# Patient Record
Sex: FEMALE | Race: WHITE | NOT HISPANIC OR LATINO | Employment: FULL TIME | URBAN - METROPOLITAN AREA
[De-identification: names, ages, dates, MRNs, and addresses within clinical notes are randomized per-mention and may not be internally consistent; named-entity substitution may affect disease eponyms.]

---

## 2017-01-23 PROBLEM — Z01.419 WELL WOMAN EXAM WITH ROUTINE GYNECOLOGICAL EXAM: Chronic | Status: ACTIVE | Noted: 2017-01-23

## 2018-11-30 PROBLEM — Z01.419 WELL WOMAN EXAM WITH ROUTINE GYNECOLOGICAL EXAM: Status: ACTIVE | Noted: 2017-01-23

## 2018-12-05 ENCOUNTER — OFFICE VISIT (OUTPATIENT)
Dept: OBSTETRICS AND GYNECOLOGY | Facility: CLINIC | Age: 33
End: 2018-12-05

## 2018-12-05 VITALS — RESPIRATION RATE: 14 BRPM | DIASTOLIC BLOOD PRESSURE: 72 MMHG | WEIGHT: 192 LBS | SYSTOLIC BLOOD PRESSURE: 110 MMHG

## 2018-12-05 DIAGNOSIS — Z01.419 WELL WOMAN EXAM WITH ROUTINE GYNECOLOGICAL EXAM: Primary | ICD-10-CM

## 2018-12-05 PROCEDURE — 99385 PREV VISIT NEW AGE 18-39: CPT | Performed by: OBSTETRICS & GYNECOLOGY

## 2018-12-05 NOTE — PROGRESS NOTES
Subjective   Chief Complaint   Patient presents with   • [facility]     Marni Danielle is a 33 y.o. year old  presenting to be seen for her annual exam.     SEXUAL Hx:  She is currently sexually active.  In the past year there there has been NO new sexual partners.    Condoms are never used.  She would not like to be screened for STD's at today's exam.  Current birth control method: Essure.  She is happy with her current method of contraception and does not want to discuss alternative methods of contraception.  MENSTRUAL Hx:  Patient's last menstrual period was 11/15/2018 (approximate).  In the past 6 months her cycles have been regular, predictable and occur monthly.  Her menstrual flow is typically normal.   Each month on average there are roughly 0 day(s) of very heavy flow.    Intermenstrual bleeding is absent.    Post-coital bleeding is present - intermittently.  Dysmenorrhea: is not affecting her activities of daily living  PMS: is not affecting her activities of daily living  Her cycles are not a source of concern for her that she wishes to discuss today.  HEALTH Hx:  She exercises regularly: no (but is planning to start exercising more ).  She wears her seat belt: yes.  She has concerns about domestic violence: no.  OTHER THINGS SHE WANTS TO DISCUSS TODAY:  Nothing else    The following portions of the patient's history were reviewed and updated as appropriate:problem list, current medications, allergies, past family history, past medical history, past social history and past surgical history.    Social History    Tobacco Use      Smoking status: Never Smoker      Smokeless tobacco: Never Used    Review of Systems  Constitutional POS: nothing reported    NEG: anorexia or night sweats   Genitourinary POS: nothing reported    NEG: dysuria or hematuria      Gastointestinal POS: nothing reported    NEG: bloating, change in bowel habits, melena or reflux symptoms   Integument POS: nothing reported     NEG: moles that are changing in size, shape, color or rashes   Breast POS: nothing reported    NEG: persistent breast lump, skin dimpling or nipple discharge        Objective   /72   Resp 14   Wt 87.1 kg (192 lb)   LMP 11/15/2018 (Approximate)   Breastfeeding? No     General:  well developed; well nourished  no acute distress   Skin:  No suspicious lesions seen   Thyroid: normal to inspection and palpation   Breasts:  Examined in supine position  Symmetric without masses or skin dimpling  Nipples normal without inversion, lesions or discharge  There are no palpable axillary nodes   Abdomen: soft, non-tender; no masses  no umbilical or inguinal hernias are present  no hepato-splenomegaly   Pelvis: Clinical staff was present for exam  External genitalia:  normal appearance of the external genitalia including Bartholin's and [place]'s glands.  :  urethral meatus normal;  Vaginal:  normal pink mucosa without prolapse or lesions.  Cervix:  normal appearance.  Uterus:  normal size, shape and consistency. anteverted;  Adnexa:  normal bimanual exam of the adnexa.  Rectal:  digital rectal exam not performed; anus visually normal appearing.        Assessment   1. Normal GYN exam  2. PCB - This is a new finding that does need to be worked up further     Plan   1. Pap and STD testing was done today.  If she does not receive the results of the Pap within 2 weeks  time, she was instructed to call to find out the results.  I explained to Marni that the recommendations for Pap smear interval in a low risk patient's has lengthened to 3 years time.  I encouraged her to be seen yearly for a full physical exam including breast and pelvic exam even during the off years when PAP's will not be performed.  2. The importance of keeping all planned follow-up and taking all medications as prescribed was emphasized.  3. Follow up for annual exam 1 year           This note was electronically signed.    Ciaran Burdick,  M.D.  December 5, 2018    Note: Speech recognition transcription software may have been used to create portions of this document.  An attempt at proofreading has been made but errors in transcription could still be present.

## 2019-12-29 NOTE — PROGRESS NOTES
Subjective   Chief Complaint   Patient presents with   • Gynecologic Exam     Marni Danielle is a 34 y.o. year old  presenting to be seen for her annual exam.     SEXUAL Hx:  She is currently sexually active.  In the past year there there has been NO new sexual partners.    Condoms are never used.  She would not like to be screened for STD's at today's exam.  Current birth control method: Essure.  She is happy with her current method of contraception and does not want to discuss alternative methods of contraception.  MENSTRUAL Hx:  Patient's last menstrual period was 2019 (approximate).  In the past 6 months her cycles have been regular, predictable and occur monthly.  Her menstrual flow is typically moderately heavy.   Each month on average there are roughly 1 day(s) of very heavy flow.  Intermenstrual bleeding is absent.    Post-coital bleeding is absent.  Dysmenorrhea: moderate and is not affecting her activities of daily living  PMS: is not affecting her activities of daily living  Her cycles are not a source of concern for her that she wishes to discuss today.  HEALTH Hx:  She exercises regularly: no (but is planning to start exercising more ).  She wears her seat belt: yes.  She has concerns about domestic violence: no.  OTHER THINGS SHE WANTS TO DISCUSS TODAY:  Nothing else    The following portions of the patient's history were reviewed and updated as appropriate:problem list, current medications, allergies, past family history, past medical history, past social history and past surgical history.    Social History    Tobacco Use      Smoking status: Never Smoker      Smokeless tobacco: Never Used    Review of Systems  Constitutional POS: nothing reported    NEG: anorexia or night sweats   Genitourinary POS: nothing reported    NEG: dysuria or hematuria      Gastointestinal POS: nothing reported    NEG: bloating, change in bowel habits, melena or reflux symptoms   Integument POS: nothing reported     NEG: moles that are changing in size, shape, color or rashes   Breast POS: nothing reported    NEG: persistent breast lump, skin dimpling or nipple discharge        Objective   /68   Resp 14   Wt 89.4 kg (197 lb)   LMP 12/03/2019 (Approximate)   Breastfeeding No     General:  well developed; well nourished  no acute distress   Skin:  No suspicious lesions seen   Thyroid: normal to inspection and palpation   Breasts:  Examined in supine position  Symmetric without masses or skin dimpling  Nipples normal without inversion, lesions or discharge  There are no palpable axillary nodes   Abdomen: soft, non-tender; no masses  no umbilical or inguinal hernias are present  no hepato-splenomegaly   Pelvis: Clinical staff was present for exam  External genitalia:  normal appearance of the external genitalia including Bartholin's and [place]'s glands.  :  urethral meatus normal;  Vaginal:  normal pink mucosa without prolapse or lesions.  Cervix:  normal appearance.  Uterus:  anteverted; symmetrically enlarged, consisent with 10 weeks size;  Adnexa:  normal bimanual exam of the adnexa.  Rectal:  digital rectal exam not performed; anus visually normal appearing.        Assessment   1. Normal GYN exam with mild uterine enlargement  2. Menorrhagia - not significantly affecting her activities of daily living.     Plan   1. Pap was not done today.  I explained to Marni that the recommendations for Pap smear interval in a low risk patient has lengthened to 3 years time.  I told Marni she still needs to be seen in our office yearly for a full physical including breast and pelvic exam.  2. The following tests were ordered today: HgB, lipid profile, TSH and vitamin D 25-OH.  It was explained to Marni that all lab test should be back within the one week after they are performed. She will be notified about the results, regardless of the findings. If she has not been contacted by the office within 2 weeks after the  test has been performed, it is her responsibility to contact us to learn about her results.  3. The importance of keeping all planned follow-up and taking all medications as prescribed was emphasized.  4. Follow up for annual exam 1 year         This note was electronically signed.    Ciaran Burdick M.D.  December 30, 2019    Note: Speech recognition transcription software may have been used to create portions of this document.  An attempt at proofreading has been made but errors in transcription could still be present.

## 2019-12-30 ENCOUNTER — OFFICE VISIT (OUTPATIENT)
Dept: OBSTETRICS AND GYNECOLOGY | Facility: CLINIC | Age: 34
End: 2019-12-30

## 2019-12-30 VITALS — SYSTOLIC BLOOD PRESSURE: 112 MMHG | WEIGHT: 197 LBS | RESPIRATION RATE: 14 BRPM | DIASTOLIC BLOOD PRESSURE: 68 MMHG

## 2019-12-30 DIAGNOSIS — N92.0 MENORRHAGIA WITH REGULAR CYCLE: ICD-10-CM

## 2019-12-30 DIAGNOSIS — Z01.419 WELL WOMAN EXAM WITH ROUTINE GYNECOLOGICAL EXAM: Primary | ICD-10-CM

## 2019-12-30 PROCEDURE — 99395 PREV VISIT EST AGE 18-39: CPT | Performed by: OBSTETRICS & GYNECOLOGY

## 2019-12-30 NOTE — PATIENT INSTRUCTIONS
Uterine Fibroids    Uterine fibroids are lumps of tissue (tumors) in your womb (uterus). They are not cancer (are benign).  Most women with this condition do not need treatment. Sometimes fibroids can affect your ability to have children (your fertility). If that happens, you may need surgery to take out the fibroids.  Follow these instructions at home:  · Take over-the-counter and prescription medicines only as told by your doctor. Your doctor may suggest NSAIDs (such as aspirin or ibuprofen) to help with pain.  · Ask your doctor if you should:  ? Take iron pills.  ? Eat more foods that have iron in them, such as dark green, leafy vegetables.  · If directed, apply heat to your back or belly to reduce pain. Use the heat source that your doctor recommends, such as a moist heat pack or a heating pad.  ? Put a towel between your skin and the heat source.  ? Leave the heat on for 20-30 minutes.  ? Remove the heat if your skin turns bright red. This is especially important if you are unable to feel pain, heat, or cold. You may have a greater risk of getting burned.  · Pay close attention to your period (menstrual) cycles. Tell your doctor about any changes, such as:  ? A heavier blood flow than usual.  ? Needing to use more pads or tampons than normal.  ? A change in how many days your period lasts.  ? A change in symptoms that come with your period, such as cramps or back pain.  · Keep all follow-up visits as told by your doctor. This is important. Your doctor may need to watch your fibroids over time for any changes.  Contact a doctor if you:  · Have pain that does not get better with medicine or heat, such as pain or cramps in:  ? Your back.  ? The area between your hip bones (pelvic area).  ? Your belly.  · Have new bleeding between your periods.  · Have more bleeding during or between your periods.  · Feel very tired or weak.  · Feel light-headed.  Get help right away if you:  · Pass out (faint).  · Have pain in the  area between your hip bones that suddenly gets worse.  · Have bleeding that soaks a tampon or pad in 30 minutes or less.  Summary  · Uterine fibroids are lumps of tissue (tumors) in your womb (uterus). They are not cancer.  · The only treatment that most women need is taking aspirin or ibuprofen for pain.  · Contact a doctor if you have pain or cramps that do not get better with medicine.  · Make sure you know what symptoms you should get help for right away.  This information is not intended to replace advice given to you by your health care provider. Make sure you discuss any questions you have with your health care provider.  Document Released: 01/20/2012 Document Revised: 11/13/2018 Document Reviewed: 11/13/2018  [employer] Interactive Patient Education © 2019 [employer]

## 2020-01-01 ENCOUNTER — LAB (OUTPATIENT)
Dept: LAB | Facility: HOSPITAL | Age: 35
End: 2020-01-01

## 2020-01-01 DIAGNOSIS — N92.0 MENORRHAGIA WITH REGULAR CYCLE: ICD-10-CM

## 2020-01-01 DIAGNOSIS — Z01.419 WELL WOMAN EXAM WITH ROUTINE GYNECOLOGICAL EXAM: ICD-10-CM

## 2020-01-01 LAB
25(OH)D3 SERPL-MCNC: 24.1 NG/ML (ref 30–100)
CHOLEST SERPL-MCNC: 106 MG/DL (ref 0–200)
HCT VFR BLD AUTO: 41.1 % (ref 34–46.6)
HDLC SERPL-MCNC: 45 MG/DL (ref 40–60)
HGB BLD-MCNC: 13.9 G/DL (ref 12–15.9)
LDLC SERPL CALC-MCNC: 51 MG/DL (ref 0–100)
LDLC/HDLC SERPL: 1.13 {RATIO}
TRIGL SERPL-MCNC: 50 MG/DL (ref 0–150)
TSH SERPL DL<=0.05 MIU/L-ACNC: 0.57 UIU/ML (ref 0.27–4.2)
VLDLC SERPL-MCNC: 10 MG/DL (ref 5–40)

## 2020-01-01 PROCEDURE — 85018 HEMOGLOBIN: CPT

## 2020-01-01 PROCEDURE — 80061 LIPID PANEL: CPT

## 2020-01-01 PROCEDURE — 36415 COLL VENOUS BLD VENIPUNCTURE: CPT

## 2020-01-01 PROCEDURE — 82306 VITAMIN D 25 HYDROXY: CPT

## 2020-01-01 PROCEDURE — 85014 HEMATOCRIT: CPT

## 2020-01-01 PROCEDURE — 84443 ASSAY THYROID STIM HORMONE: CPT

## 2020-12-22 ENCOUNTER — PREP FOR SURGERY (OUTPATIENT)
Dept: OTHER | Facility: HOSPITAL | Age: 35
End: 2020-12-22

## 2020-12-22 NOTE — PATIENT INSTRUCTIONS
Tdap Vaccine (Tetanus, Diphtheria and Pertussis): What You Need to Know      1. Why get vaccinated?  Tetanus, diphtheria and pertussis are very serious diseases. Tdap vaccine can protect us from these diseases. And, Tdap vaccine given to pregnant women can protect  babies against pertussis..  TETANUS (Lockjaw) is rare in the [place] today. It causes painful muscle tightening and stiffness, usually all over the body.  · It can lead to tightening of muscles in the head and neck so you can't open your mouth, swallow, or sometimes even breathe. Tetanus kills about 1 out of 10 people who are infected even after receiving the best medical care.  DIPHTHERIA is also rare in the [place] today. It can cause a thick coating to form in the back of the throat.  · It can lead to breathing problems, heart failure, paralysis, and death.  PERTUSSIS (Whooping Cough) causes severe coughing spells, which can cause difficulty breathing, vomiting and disturbed sleep.  · It can also lead to weight loss, incontinence, and rib fractures. Up to 2 in 100 adolescents and 5 in 100 adults with pertussis are hospitalized or have complications, which could include pneumonia or death.    These diseases are caused by bacteria. Diphtheria and pertussis are spread from person to person through secretions from coughing or sneezing. Tetanus enters the body through cuts, scratches, or wounds.  Before vaccines, as many as 200,000 cases of diphtheria, 200,000 cases of pertussis, and hundreds of cases of tetanus, were reported in the [place] each year. Since vaccination began, reports of cases for tetanus and diphtheria have dropped by about 99% and for pertussis by about 80%.    2. Tdap vaccine  • Tdap vaccine can protect adolescents and adults from tetanus, diphtheria, and pertussis. One dose of Tdap is routinely given at age 11 or 12. People who did not get Tdap at that age should get it as soon as possible.  • Tdap is especially  important for healthcare professionals and anyone having close contact with a baby younger than 12 months.  • Pregnant women should get a dose of Tdap during every pregnancy, to protect the  from pertussis. Infants are most at risk for severe, life-threatening complications from pertussis.  • Another vaccine, called Td, protects against tetanus and diphtheria, but not pertussis. A Td booster should be given every 10 years. Tdap may be given as one of these boosters if you have never gotten Tdap before. Tdap may also be given after a severe cut or burn to prevent tetanus infection.  • Your doctor or the person giving you the vaccine can give you more information.  • Tdap may safely be given at the same time as other vaccines.    3. Some people should not get this vaccine  · A person who has ever had a life-threatening allergic reaction after a previous dose of any diphtheria, tetanus or pertussis containing vaccine, OR has a severe allergy to any part of this vaccine, should not get Tdap vaccine. Tell the person giving the vaccine about any severe allergies.  · Anyone who had coma or long repeated seizures within 7 days after a childhood dose of DTP or DTaP, or a previous dose of Tdap, should not get Tdap, unless a cause other than the vaccine was found. They can still get Td.  · Talk to your doctor if you:  ? have seizures or another nervous system problem,  ? had severe pain or swelling after any vaccine containing diphtheria, tetanus or pertussis,  ? ever had a condition called Guillain-Barré Syndrome (GBS),  ? aren't feeling well on the day the shot is scheduled.    4. Risks  With any medicine, including vaccines, there is a chance of side effects. These are usually mild and go away on their own. Serious reactions are also possible but are rare.  Most people who get Tdap vaccine do not have any problems with it.  Mild problems following Tdap  (Did not interfere with activities)  · Pain where the shot was  given (about 3 in 4 adolescents or 2 in 3 adults)  · Redness or swelling where the shot was given (about 1 person in 5)  · Mild fever of at least 100.4°F (up to about 1 in 25 adolescents or 1 in 100 adults)  · Headache (about 3 or 4 people in 10)  · Tiredness (about 1 person in 3 or 4)  · Nausea, vomiting, diarrhea, stomach ache (up to 1 in 4 adolescents or 1 in 10 adults)  · Chills, sore joints (about 1 person in 10)  · Body aches (about 1 person in 3 or 4)  · Rash, swollen glands (uncommon)  Moderate problems following Tdap  (Interfered with activities, but did not require medical attention)  · Pain where the shot was given (up to 1 in 5 or 6)  · Redness or swelling where the shot was given (up to about 1 in 16 adolescents or 1 in 12 adults)  · Fever over 102°F (about 1 in 100 adolescents or 1 in 250 adults)  · Headache (about 1 in 7 adolescents or 1 in 10 adults)  · Nausea, vomiting, diarrhea, stomach ache (up to 1 or 3 people in 100)  · Swelling of the entire arm where the shot was given (up to about 1 in 500).  Severe problems following Tdap  (Unable to perform usual activities; required medical attention)  · Swelling, severe pain, bleeding and redness in the arm where the shot was given (rare).  Problems that could happen after any vaccine:  · People sometimes faint after a medical procedure, including vaccination. Sitting or lying down for about 15 minutes can help prevent fainting, and injuries caused by a fall. Tell your doctor if you feel dizzy, or have vision changes or ringing in the ears.  · Some people get severe pain in the shoulder and have difficulty moving the arm where a shot was given. This happens very rarely.  · Any medication can cause a severe allergic reaction. Such reactions from a vaccine are very rare, estimated at fewer than 1 in a million doses, and would happen within a few minutes to a few hours after the vaccination.  · As with any medicine, there is a very remote chance of a vaccine  causing a serious injury or death.  · The safety of vaccines is always being monitored. For more information, visit: www.cdc.gov/vaccinesafety/    5. What if there is a serious problem?  What should I look for?  · Look for anything that concerns you, such as signs of a severe allergic reaction, very high fever, or unusual behavior.  · Signs of a severe allergic reaction can include hives, swelling of the face and throat, difficulty breathing, a fast heartbeat, dizziness, and weakness. These would usually start a few minutes to a few hours after the vaccination.  What should I do?  · If you think it is a severe allergic reaction or other emergency that can't wait, call 9-1-1 or get the person to the nearest hospital. Otherwise, call your doctor.  · Afterward, the reaction should be reported to the Vaccine Adverse Event Reporting System (VAERS). Your doctor might file this report, or you can do it yourself through the VAERS web site at www.vaers.[facility].gov, or by calling 1-361.661.9392.  · VAERS does not give medical advice.    6. The National Vaccine Injury Compensation Program  The National Vaccine Injury Compensation Program (VICP) is a federal program that was created to compensate people who may have been injured by certain vaccines.  Persons who believe they may have been injured by a vaccine can learn about the program and about filing a claim by calling 1-307.614.2035 or visiting the VICP website at www.hrsa.gov/vaccinecompensation. There is a time limit to file a claim for compensation.    7. How can I learn more?  · Ask your doctor. He or she can give you the vaccine package insert or suggest other sources of information.  · Call your local or state health department.  · Contact the Centers for Disease Control and Prevention (CDC):  ? Call 1-631.909.5620 (4-929-LRD-INFO) or  ? Visit CDC's website at www.cdc.gov/vaccines      Vaccine Information Statement Tdap Vaccine (2/24/2015)  This information is not  intended to replace advice given to you by your health care provider. Make sure you discuss any questions you have with your health care provider.  Document Released: 06/18/2013 Document Revised: 08/05/2019 Document Reviewed: 08/05/2019  [employer] Interactive Patient Education © 2019 [employer]         Influenza (Flu) Vaccine (Inactivated or Recombinant): What You Need to Know      1. Why get vaccinated?  Influenza vaccine can prevent influenza (flu).  Flu is a contagious disease that spreads around the [place] every year, usually between October and May. Anyone can get the flu, but it is more dangerous for some people. Infants and young children, people 65 years of age and older, pregnant women, and people with certain health conditions or a weakened immune system are at greatest risk of flu complications.  Pneumonia, bronchitis, sinus infections and ear infections are examples of flu-related complications. If you have a medical condition, such as heart disease, cancer or diabetes, flu can make it worse.  Flu can cause fever and chills, sore throat, muscle aches, fatigue, cough, headache, and runny or stuffy nose. Some people may have vomiting and diarrhea, though this is more common in children than adults.  Each year thousands of people in the [place] die from flu, and many more are hospitalized. Flu vaccine prevents millions of illnesses and flu-related visits to the doctor each year.  2. Influenza vaccine  CDC recommends everyone 6 months of age and older get vaccinated every flu season. Children 6 months through 8 years of age may need 2 doses during a single flu season. Everyone else needs only 1 dose each flu season.  It takes about 2 weeks for protection to develop after vaccination.  There are many flu viruses, and they are always changing. Each year a new flu vaccine is made to protect against three or four viruses that are likely to cause disease in the upcoming flu season. Even when the  vaccine doesn't exactly match these viruses, it may still provide some protection.  Influenza vaccine does not cause flu.  Influenza vaccine may be given at the same time as other vaccines.  3. Talk with your health care provider  Tell your vaccine provider if the person getting the vaccine:  · Has had an allergic reaction after a previous dose of influenza vaccine, or has any severe, life-threatening allergies.  · Has ever had Guillain-Barré Syndrome (also called GBS).  In some cases, your health care provider may decide to postpone influenza vaccination to a future visit.  People with minor illnesses, such as a cold, may be vaccinated. People who are moderately or severely ill should usually wait until they recover before getting influenza vaccine.  Your health care provider can give you more information.  4. Risks of a vaccine reaction  · Soreness, redness, and swelling where shot is given, fever, muscle aches, and headache can happen after influenza vaccine.  · There may be a very small increased risk of Guillain-Barré Syndrome (GBS) after inactivated influenza vaccine (the flu shot).  · Young children who get the flu shot along with pneumococcal vaccine (PCV13), and/or DTaP vaccine at the same time might be slightly more likely to have a seizure caused by fever. Tell your health care provider if a child who is getting flu vaccine has ever had a seizure.  · People sometimes faint after medical procedures, including vaccination. Tell your provider if you feel dizzy or have vision changes or ringing in the ears.  · As with any medicine, there is a very remote chance of a vaccine causing a severe allergic reaction, other serious injury, or death.  5. What if there is a serious problem?  An allergic reaction could occur after the vaccinated person leaves the clinic. If you see signs of a severe allergic reaction (hives, swelling of the face and throat, difficulty breathing, a fast heartbeat, dizziness, or weakness),  call 9-1-1 and get the person to the nearest hospital.  For other signs that concern you, call your health care provider.  Adverse reactions should be reported to the Vaccine Adverse Event Reporting System (VAERS). Your health care provider will usually file this report, or you can do it yourself. Visit the VAERS website at www.vaers.[facility].gov or call 1-761.513.7039.VAERS is only for reporting reactions, and VAERS staff do not give medical advice.  6. The National Vaccine Injury Compensation Program  The National Vaccine Injury Compensation Program (VICP) is a federal program that was created to compensate people who may have been injured by certain vaccines. Visit the VICP website at www.[facility].gov/vaccinecompensation or call 1-893.602.1352 to learn about the program and about filing a claim. There is a time limit to file a claim for compensation.  7. How can I learn more?  · Ask your healthcare provider.  · Call your local or state health department.  · Contact the Centers for Disease Control and Prevention (CDC):  ? Call 1-856.321.2991 (7-835-VAS-INFO) or  ? Visit CDC's www.cdc.gov/flu    Vaccine Information Statement (Interim) Inactivated Influenza Vaccine (8/15/2019)  This information is not intended to replace advice given to you by your health care provider. Make sure you discuss any questions you have with your health care provider.  Document Released: 10/12/2007 Document Revised: 04/07/2020 Document Reviewed: 08/19/2019  Elsevier Patient Education © 2020 [employer] Inc.

## 2020-12-22 NOTE — PROGRESS NOTES
Subjective   Chief Complaint   Patient presents with   • Gynecologic Exam     Marni Danielle is a 35 y.o. year old  presenting to be seen for her annual exam.     SEXUAL Hx:  She is currently sexually active.  In the past year there there has been NO new sexual partners.    Condoms are never used.  She would not like to be screened for STD's at today's exam.  Current birth control method: Essure.  She is happy with her current method of contraception and does not want to discuss alternative methods of contraception.  MENSTRUAL Hx:  Patient's last menstrual period was 2020 (approximate).  In the past 6 months her cycles have been regular, predictable and occur monthly.  Her menstrual flow is typically normal.   Each month on average there are roughly 0 day(s) of very heavy flow.  Intermenstrual bleeding is absent.    Post-coital bleeding is absent.  Dysmenorrhea: is not affecting her activities of daily living  PMS: is not affecting her activities of daily living  Her cycles are not a source of concern for her that she wishes to discuss today.  HEALTH Hx:  She exercises regularly: yes.  She wears her seat belt: yes.  She has concerns about domestic violence: no.  OTHER THINGS SHE WANTS TO DISCUSS TODAY:  Nothing else    The following portions of the patient's history were reviewed and updated as appropriate:problem list, current medications, allergies, past family history, past medical history, past social history and past surgical history.    Social History    Tobacco Use      Smoking status: Never Smoker      Smokeless tobacco: Never Used    Review of Systems  Constitutional POS: nothing reported    NEG: anorexia or night sweats   Genitourinary POS: nothing reported    NEG: dysuria or hematuria      Gastointestinal POS: nothing reported    NEG: bloating, change in bowel habits, melena or reflux symptoms   Integument POS: nothing reported    NEG: moles that are changing in size, shape, color or rashes    Breast POS: nothing reported    NEG: persistent breast lump, skin dimpling or nipple discharge        Objective   /74   Resp 14   Wt 86.2 kg (190 lb)   LMP 12/23/2020 (Approximate)   Breastfeeding No     General:  well developed; well nourished  no acute distress   Skin:  No suspicious lesions seen   Thyroid: normal to inspection and palpation   Breasts:  Examined in supine position  Symmetric without masses or skin dimpling  Nipples normal without inversion, lesions or discharge  There are no palpable axillary nodes   Abdomen: soft, non-tender; no masses  no umbilical or inguinal hernias are present  no hepato-splenomegaly   Pelvis: Clinical staff was present for exam  External genitalia:  normal appearance of the external genitalia including Bartholin's and Webster's glands.  :  urethral meatus normal;  Vaginal:  normal pink mucosa without prolapse or lesions.  Cervix:  normal appearance.  Uterus:  normal size, shape and consistency.  Adnexa:  normal bimanual exam of the adnexa.  Rectal:  digital rectal exam not performed; anus visually normal appearing.        Assessment   1. Normal GYN exam     Plan   1. Pap was not done today.  I explained to Marni that the recommendations for Pap smear interval in a low risk patient has lengthened to 3 years time.  I told Marni she still needs to be seen in our office yearly for a full physical including breast and pelvic exam.  2. I discussed with Marni that she may be behind on needed vaccinations for Influenza and TDAP.  She may be able to obtain these vaccinations at her local pharmacy OR speak about obtaining them with her primary care.  If she does obtain her vaccines, I have asked Marni to let us know the date each vaccine was obtained so that her medical record could be updated in our system.  3. Today I discussed with Marni the total recommended calcium intake for a premenopausal female is 1000 mg.  Ideally this should be from dietary  sources.  I reviewed calcium content in various foods including milk, fortified orange juice and yogurt.  If she cannot get sufficient calcium through dietary means, it is recommended to supplement with either a multivitamin or calcium to reach her daily goal.  I also reviewed the difference in the bioavailability of calcium carbonate and calcium citrate containing supplements and the importance of taking calcium carbonate containing products with food.  Finally, vitamin D's role in calcium absorption was reviewed and a total daily vitamin D intake of 800 units was recommended.  4. The importance of keeping all planned follow-up and taking all medications as prescribed was emphasized.  5. Follow up for annual exam 1 year         This note was electronically signed.    Ciaran Burdick M.D.  December 31, 2020    Note: Speech recognition transcription software may have been used to create portions of this document.  An attempt at proofreading has been made but errors in transcription could still be present.

## 2020-12-31 ENCOUNTER — OFFICE VISIT (OUTPATIENT)
Dept: OBSTETRICS AND GYNECOLOGY | Facility: CLINIC | Age: 35
End: 2020-12-31

## 2020-12-31 VITALS — SYSTOLIC BLOOD PRESSURE: 118 MMHG | DIASTOLIC BLOOD PRESSURE: 74 MMHG | RESPIRATION RATE: 14 BRPM | WEIGHT: 190 LBS

## 2020-12-31 DIAGNOSIS — Z01.419 WELL WOMAN EXAM WITH ROUTINE GYNECOLOGICAL EXAM: Primary | ICD-10-CM

## 2020-12-31 DIAGNOSIS — Z71.85 VACCINE COUNSELING: ICD-10-CM

## 2020-12-31 PROCEDURE — 99395 PREV VISIT EST AGE 18-39: CPT | Performed by: OBSTETRICS & GYNECOLOGY

## 2021-03-03 ENCOUNTER — TELEPHONE (OUTPATIENT)
Dept: OBSTETRICS AND GYNECOLOGY | Facility: CLINIC | Age: 36
End: 2021-03-03

## 2021-03-03 NOTE — TELEPHONE ENCOUNTER
Pt called says Dr Burdick performed a surgery back in 2014 where he inserted a spring into tubes and she wants to know if the procedure could be reversed because she wants children now please advise she says she will set up appointment once she knows

## 2021-03-03 NOTE — TELEPHONE ENCOUNTER
Patient called stating she had Essure procedure done in 2014 wanting to know if procedure could be reversed.

## 2022-01-24 ENCOUNTER — OFFICE VISIT (OUTPATIENT)
Dept: CARDIOLOGY | Facility: CLINIC | Age: 37
End: 2022-01-24

## 2022-01-24 ENCOUNTER — TELEPHONE (OUTPATIENT)
Dept: CARDIOLOGY | Facility: CLINIC | Age: 37
End: 2022-01-24

## 2022-01-24 VITALS
OXYGEN SATURATION: 98 % | DIASTOLIC BLOOD PRESSURE: 78 MMHG | HEIGHT: 70 IN | HEART RATE: 71 BPM | BODY MASS INDEX: 26.74 KG/M2 | WEIGHT: 186.8 LBS | SYSTOLIC BLOOD PRESSURE: 114 MMHG

## 2022-01-24 DIAGNOSIS — R00.2 PALPITATIONS: Primary | ICD-10-CM

## 2022-01-24 PROCEDURE — 99204 OFFICE O/P NEW MOD 45 MIN: CPT | Performed by: INTERNAL MEDICINE

## 2022-01-24 RX ORDER — CARVEDILOL 3.12 MG/1
3.12 TABLET ORAL 2 TIMES DAILY
COMMUNITY
Start: 2021-12-08 | End: 2022-01-24

## 2022-01-24 NOTE — PROGRESS NOTES
"[facility] Cardiology  Consultation H&P  Marni Danielle  1985  [phone]  There is no work phone number on file..    VISIT DATE:  01/24/2022    PCP: Stefanie Holliday, APRN  [address] [facility] [address]  [facility] [address]    CC:  Chief Complaint   Patient presents with   • Irregular Heart Beat       ASSESSMENT:   Diagnosis Plan   1. Palpitations  Cardiac Event Monitor    Adult Transthoracic Echo Complete W/ Cont if Necessary Per Protocol         PLAN:  30-day ambulatory ECG monitor for symptom rhythm correlation  Transthoracic echocardiogram to assess underlying myocardial structure and function  Discontinuing scheduled carvedilol, adding metoprolol tartrate 25 mg p.o. as needed for palpitations.  Discussed vagal maneuvers.    History of Present Illness   36-year-old female who has been having palpitations intermittently at least for the previous 6 to 8 months.  Onset shortly after receiving her Covid vaccines.  Also under increased emotional stress.  No obvious triggers otherwise.  Will come on suddenly and then often gradually subside can last up to an hour.  Experiences mild chest pressure and some lightheadedness during these episodes, heart rates will get up to 200 bpm as measured by her apple watch and noninvasively at her primary care physician's office.  Arrhythmia had broken before she was able to be hooked up during a twelve-lead EKG.  Normal baseline EKG.  Normal laboratory evaluation.  No exertional symptoms.    PHYSICAL EXAMINATION:  Vitals:    01/24/22 0853   BP: 114/78   BP Location: Left arm   Patient Position: Sitting   Pulse: 71   SpO2: 98%   Weight: 84.7 kg (186 lb 12.8 oz)   Height: 177.8 cm (70\")     General Appearance:    Alert, cooperative, no distress, appears stated age   Head:    Normocephalic, without obvious abnormality, atraumatic   Eyes:    conjunctiva/corneas clear, EOM's intact, fundi     benign, both eyes   Ears:    Normal TM's and external ear " canals, both ears   Nose:   Nares normal, septum midline, mucosa normal, no drainage    or sinus tenderness   Throat:   Lips, mucosa, and tongue normal; teeth and gums normal   Neck:   Supple, symmetrical, trachea midline, no adenopathy;     thyroid:  no enlargement/tenderness/nodules; no carotid    bruit or JVD   Back:     Symmetric, no curvature, ROM normal, no CVA tenderness   Lungs:     Clear to auscultation bilaterally, respirations unlabored   Chest Wall:    No tenderness or deformity    Heart:    Regular rate and rhythm, S1 and S2 normal, no murmur, rub   or gallop, normal carotid impulse bilaterally without bruit.   Abdomen:     Soft, non-tender, bowel sounds active all four quadrants,     no masses, no organomegaly   Extremities:   Extremities normal, atraumatic, no cyanosis or edema   Pulses:   2+ and symmetric all extremities   Skin:   Skin color, texture, turgor normal, no rashes or lesions   Lymph nodes:   Cervical, supraclavicular, and axillary nodes normal   Neurologic:   normal strength, sensation intact     throughout       Diagnostic Data:  Procedures  Lab Results   Component Value Date    TRIG 50 01/01/2020    HDL 45 01/01/2020     No results found for: GLUCOSE, BUN, CREATININE, NA, K, CL, CO2, CREATININE, BUN  No results found for: HGBA1C  Lab Results   Component Value Date    WBC 4.42 10/13/2015    HGB 13.9 01/01/2020    HCT 41.1 01/01/2020     10/13/2015       PROBLEM LIST:  Patient Active Problem List   Diagnosis   • Annual GYN exam w/o problems   • Palpitations       PAST MEDICAL HX  History reviewed. No pertinent past medical history.    Allergies  No Known Allergies    Current Medications    Current Outpatient Medications:   •  metoprolol tartrate (LOPRESSOR) 25 MG tablet, Take 1 tablet by mouth As Needed (palpitations)., Disp: 10 tablet, Rfl: 0         ROS  ROS    All other body systems reviewed and are negative    SOCIAL HX  Social History     Socioeconomic History   • Marital  status:    Tobacco Use   • Smoking status: Never Smoker   • Smokeless tobacco: Never Used   Vaping Use   • Vaping Use: Never used   Substance and Sexual Activity   • Alcohol use: Yes   • Drug use: No   • Sexual activity: Yes       FAMILY HX  Family History   Problem Relation Age of Onset   • Breast cancer Paternal Grandmother    • No Known Problems Mother    • No Known Problems Father    • No Known Problems Sister    • No Known Problems Brother              Steve Robbins III, MD, FACC

## 2022-01-24 NOTE — TELEPHONE ENCOUNTER
Ruth at [facility] is requesting clarification on Metoprolol Rx. Discussed with  in clinic. Metoprolol 25 mg . Take one tablet by mouth every 8 hours as needed for palpitations.

## 2022-01-28 ENCOUNTER — HOSPITAL ENCOUNTER (OUTPATIENT)
Dept: CARDIOLOGY | Facility: HOSPITAL | Age: 37
Discharge: HOME | End: 2022-01-28
Admitting: INTERNAL MEDICINE

## 2022-01-28 VITALS — BODY MASS INDEX: 26.51 KG/M2 | HEIGHT: 70 IN | WEIGHT: 185.19 LBS

## 2022-01-28 DIAGNOSIS — R00.2 PALPITATIONS: ICD-10-CM

## 2022-01-28 PROCEDURE — 93306 TTE W/DOPPLER COMPLETE: CPT

## 2022-01-28 PROCEDURE — 93306 TTE W/DOPPLER COMPLETE: CPT | Performed by: INTERNAL MEDICINE

## 2022-01-31 ENCOUNTER — TELEPHONE (OUTPATIENT)
Dept: CARDIOLOGY | Facility: CLINIC | Age: 37
End: 2022-01-31

## 2022-01-31 LAB
ASCENDING AORTA: 3.5 CM
BH CV ECHO MEAS - AO MAX PG (FULL): 4.1 MMHG
BH CV ECHO MEAS - AO MAX PG: 9 MMHG
BH CV ECHO MEAS - AO MEAN PG (FULL): 2 MMHG
BH CV ECHO MEAS - AO MEAN PG: 5 MMHG
BH CV ECHO MEAS - AO ROOT AREA (BSA CORRECTED): 1.5
BH CV ECHO MEAS - AO ROOT AREA: 7.1 CM^2
BH CV ECHO MEAS - AO ROOT DIAM: 3 CM
BH CV ECHO MEAS - AO V2 MAX: 146 CM/SEC
BH CV ECHO MEAS - AO V2 MEAN: 105 CM/SEC
BH CV ECHO MEAS - AO V2 VTI: 30.9 CM
BH CV ECHO MEAS - ASC AORTA: 3.5 CM
BH CV ECHO MEAS - AVA(I,A): 2.6 CM^2
BH CV ECHO MEAS - AVA(I,D): 2.6 CM^2
BH CV ECHO MEAS - AVA(V,A): 2.6 CM^2
BH CV ECHO MEAS - AVA(V,D): 2.6 CM^2
BH CV ECHO MEAS - BSA(HAYCOCK): 2.1 M^2
BH CV ECHO MEAS - BSA: 2 M^2
BH CV ECHO MEAS - BZI_BMI: 26.7 KILOGRAMS/M^2
BH CV ECHO MEAS - BZI_METRIC_HEIGHT: 177.8 CM
BH CV ECHO MEAS - BZI_METRIC_WEIGHT: 84.4 KG
BH CV ECHO MEAS - EDV(CUBED): 111.3 ML
BH CV ECHO MEAS - EDV(MOD-SP2): 60 ML
BH CV ECHO MEAS - EDV(MOD-SP4): 55 ML
BH CV ECHO MEAS - EDV(TEICH): 108 ML
BH CV ECHO MEAS - EF(CUBED): 68.6 %
BH CV ECHO MEAS - EF(MOD-SP2): 68.3 %
BH CV ECHO MEAS - EF(MOD-SP4): 63.6 %
BH CV ECHO MEAS - EF(TEICH): 60 %
BH CV ECHO MEAS - ESV(CUBED): 35 ML
BH CV ECHO MEAS - ESV(MOD-SP2): 19 ML
BH CV ECHO MEAS - ESV(MOD-SP4): 20 ML
BH CV ECHO MEAS - ESV(TEICH): 43.2 ML
BH CV ECHO MEAS - FS: 32 %
BH CV ECHO MEAS - IVS/LVPW: 0.92
BH CV ECHO MEAS - IVSD: 1 CM
BH CV ECHO MEAS - LA DIMENSION: 3.6 CM
BH CV ECHO MEAS - LA/AO: 1.2
BH CV ECHO MEAS - LAD MAJOR: 5.7 CM
BH CV ECHO MEAS - LAT PEAK E' VEL: 13.7 CM/SEC
BH CV ECHO MEAS - LATERAL E/E' RATIO: 5.8
BH CV ECHO MEAS - LV DIASTOLIC VOL/BSA (35-75): 27.2 ML/M^2
BH CV ECHO MEAS - LV IVRT: 0.09 SEC
BH CV ECHO MEAS - LV MASS(C)D: 181.1 GRAMS
BH CV ECHO MEAS - LV MASS(C)DI: 89.5 GRAMS/M^2
BH CV ECHO MEAS - LV MAX PG: 4.9 MMHG
BH CV ECHO MEAS - LV MEAN PG: 3 MMHG
BH CV ECHO MEAS - LV SYSTOLIC VOL/BSA (12-30): 9.9 ML/M^2
BH CV ECHO MEAS - LV V1 MAX: 111 CM/SEC
BH CV ECHO MEAS - LV V1 MEAN: 81.3 CM/SEC
BH CV ECHO MEAS - LV V1 VTI: 23.2 CM
BH CV ECHO MEAS - LVIDD: 4.8 CM
BH CV ECHO MEAS - LVIDS: 3.3 CM
BH CV ECHO MEAS - LVLD AP2: 8.4 CM
BH CV ECHO MEAS - LVLD AP4: 7.3 CM
BH CV ECHO MEAS - LVLS AP2: 7.2 CM
BH CV ECHO MEAS - LVLS AP4: 7.3 CM
BH CV ECHO MEAS - LVOT AREA (M): 3.5 CM^2
BH CV ECHO MEAS - LVOT AREA: 3.5 CM^2
BH CV ECHO MEAS - LVOT DIAM: 2.1 CM
BH CV ECHO MEAS - LVPWD: 1.1 CM
BH CV ECHO MEAS - MED PEAK E' VEL: 10.9 CM/SEC
BH CV ECHO MEAS - MEDIAL E/E' RATIO: 7.3
BH CV ECHO MEAS - MV A MAX VEL: 56.3 CM/SEC
BH CV ECHO MEAS - MV DEC SLOPE: 368 CM/SEC^2
BH CV ECHO MEAS - MV DEC TIME: 0.2 SEC
BH CV ECHO MEAS - MV E MAX VEL: 79.5 CM/SEC
BH CV ECHO MEAS - MV E/A: 1.4
BH CV ECHO MEAS - MV P1/2T MAX VEL: 96.4 CM/SEC
BH CV ECHO MEAS - MV P1/2T: 76.7 MSEC
BH CV ECHO MEAS - MVA P1/2T LCG: 2.3 CM^2
BH CV ECHO MEAS - MVA(P1/2T): 2.9 CM^2
BH CV ECHO MEAS - PA ACC SLOPE: 505.5 CM/SEC^2
BH CV ECHO MEAS - PA ACC TIME: 0.13 SEC
BH CV ECHO MEAS - PA PR(ACCEL): 20.3 MMHG
BH CV ECHO MEAS - SI(AO): 107.9 ML/M^2
BH CV ECHO MEAS - SI(CUBED): 37.7 ML/M^2
BH CV ECHO MEAS - SI(LVOT): 39.7 ML/M^2
BH CV ECHO MEAS - SI(MOD-SP2): 20.3 ML/M^2
BH CV ECHO MEAS - SI(MOD-SP4): 17.3 ML/M^2
BH CV ECHO MEAS - SI(TEICH): 32.1 ML/M^2
BH CV ECHO MEAS - SV(AO): 218.4 ML
BH CV ECHO MEAS - SV(CUBED): 76.3 ML
BH CV ECHO MEAS - SV(LVOT): 80.4 ML
BH CV ECHO MEAS - SV(MOD-SP2): 41 ML
BH CV ECHO MEAS - SV(MOD-SP4): 35 ML
BH CV ECHO MEAS - SV(TEICH): 64.9 ML
BH CV ECHO MEAS - TAPSE (>1.6): 2.3 CM
BH CV ECHO MEAS - TR MAX PG: 16 MMHG
BH CV ECHO MEAS - TR MAX VEL: 201 CM/SEC
BH CV ECHO MEASUREMENTS AVERAGE E/E' RATIO: 6.46
BH CV VAS BP RIGHT ARM: NORMAL MMHG
BH CV XLRA - RV BASE: 3.1 CM
BH CV XLRA - RV LENGTH: 6.8 CM
BH CV XLRA - RV MID: 2.4 CM
BH CV XLRA - TDI S': 13.3 CM/SEC
IVRT: 85 MSEC
LEFT ATRIUM VOLUME INDEX: 18.8 ML/M^2
LEFT ATRIUM VOLUME: 38 ML
MAXIMAL PREDICTED HEART RATE: 184 BPM
STRESS TARGET HR: 156 BPM

## 2022-01-31 NOTE — TELEPHONE ENCOUNTER
----- Message from Steve Robbins III, MD sent at 1/31/2022  9:05 AM EST -----  Normal heart function noted on echo.

## 2022-02-25 ENCOUNTER — TELEPHONE (OUTPATIENT)
Dept: CARDIOLOGY | Facility: CLINIC | Age: 37
End: 2022-02-25

## 2022-02-25 NOTE — TELEPHONE ENCOUNTER
----- Message from Steve Robbins III, MD sent at 2/24/2022  6:23 PM EST -----  Normal heart monitor.

## 2022-03-29 ENCOUNTER — OFFICE VISIT (OUTPATIENT)
Dept: OBSTETRICS AND GYNECOLOGY | Facility: CLINIC | Age: 37
End: 2022-03-29

## 2022-03-29 VITALS
DIASTOLIC BLOOD PRESSURE: 72 MMHG | BODY MASS INDEX: 27.07 KG/M2 | WEIGHT: 187 LBS | SYSTOLIC BLOOD PRESSURE: 116 MMHG | RESPIRATION RATE: 14 BRPM

## 2022-03-29 DIAGNOSIS — Z01.419 WELL WOMAN EXAM WITH ROUTINE GYNECOLOGICAL EXAM: Primary | ICD-10-CM

## 2022-03-29 DIAGNOSIS — Z71.85 VACCINE COUNSELING: ICD-10-CM

## 2022-03-29 PROBLEM — R00.2 PALPITATIONS: Status: RESOLVED | Noted: 2022-01-24 | Resolved: 2022-03-29

## 2022-03-29 PROCEDURE — 99395 PREV VISIT EST AGE 18-39: CPT | Performed by: OBSTETRICS & GYNECOLOGY
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
## 2022-03-29 NOTE — PATIENT INSTRUCTIONS

## 2022-11-17 ENCOUNTER — TELEPHONE (OUTPATIENT)
Dept: OBSTETRICS AND GYNECOLOGY | Facility: CLINIC | Age: 37
End: 2022-11-17

## 2022-11-17 NOTE — TELEPHONE ENCOUNTER
Attempted to contact pt and there was no answer.  Unable to leave message due to voicemail not being set up.

## 2022-11-17 NOTE — TELEPHONE ENCOUNTER
Provider: DR WILLIAM  Caller: LAUREN OH  Relationship to Patient: SELF  Pharmacy: [facility]  Phone Number: [phone]  Reason for Call: PT HAD A TUMMY TUCK ON 11/7/22, PT STARTED A MENS CYCLE THE EVENING OF 11/7/22. PT HAS BEEN ON CYCLE FOR 2 WEEKS.  PT HAS REQUESTED A CALLBACK TO DISCUSS, THE CYCLE STARTED AT AN ABNORMAL TIME AND DOES NOT APPEAR TO BE SLOWING DOWN.    PLEASE CALL PT AT   # [phone]    TO DISCUSS, SHE DID SPEAK WITH PROVIDER WHO PERFORMED SURGERY AND THEY ADVISED TO SPEAK TO HER GYN.

## 2022-11-21 NOTE — TELEPHONE ENCOUNTER
Called attempting to reach patient; no answer, unable to LVM requesting call back due to VM box not being set up yet.

## 2022-11-22 NOTE — TELEPHONE ENCOUNTER
Last phone call was a 3rd attempt to reach patient, no success, letter has been sent and this encounter will be completed.

## 2024-12-10 ENCOUNTER — OFFICE VISIT (OUTPATIENT)
Dept: FAMILY MEDICINE CLINIC | Facility: CLINIC | Age: 39
End: 2024-12-10
Payer: COMMERCIAL

## 2024-12-10 ENCOUNTER — OFFICE VISIT (OUTPATIENT)
Dept: CARDIOLOGY | Facility: CLINIC | Age: 39
End: 2024-12-10
Payer: COMMERCIAL

## 2024-12-10 VITALS
WEIGHT: 184.2 LBS | DIASTOLIC BLOOD PRESSURE: 72 MMHG | OXYGEN SATURATION: 98 % | HEART RATE: 84 BPM | HEIGHT: 70 IN | SYSTOLIC BLOOD PRESSURE: 116 MMHG | BODY MASS INDEX: 26.37 KG/M2 | TEMPERATURE: 98.7 F

## 2024-12-10 VITALS
SYSTOLIC BLOOD PRESSURE: 115 MMHG | DIASTOLIC BLOOD PRESSURE: 78 MMHG | BODY MASS INDEX: 26.2 KG/M2 | WEIGHT: 183 LBS | HEIGHT: 70 IN | OXYGEN SATURATION: 99 % | HEART RATE: 86 BPM

## 2024-12-10 DIAGNOSIS — R00.0 TACHYCARDIA WITH GREATER THAN 160 BEATS PER MINUTE: ICD-10-CM

## 2024-12-10 DIAGNOSIS — R00.2 PALPITATIONS: Primary | ICD-10-CM

## 2024-12-10 DIAGNOSIS — F43.9 STRESS AT HOME: ICD-10-CM

## 2024-12-10 DIAGNOSIS — F41.9 ANXIETY: ICD-10-CM

## 2024-12-10 PROCEDURE — 93000 ELECTROCARDIOGRAM COMPLETE: CPT

## 2024-12-10 PROCEDURE — 99204 OFFICE O/P NEW MOD 45 MIN: CPT

## 2024-12-10 RX ORDER — ESCITALOPRAM OXALATE 10 MG/1
10 TABLET ORAL DAILY
Qty: 60 TABLET | Refills: 0 | Status: SHIPPED | OUTPATIENT
Start: 2024-12-10

## 2024-12-10 RX ORDER — METOPROLOL SUCCINATE 25 MG/1
25 TABLET, EXTENDED RELEASE ORAL DAILY
Qty: 90 TABLET | Refills: 1 | Status: SHIPPED | OUTPATIENT
Start: 2024-12-10 | End: 2024-12-10 | Stop reason: ALTCHOICE

## 2024-12-10 RX ORDER — METOPROLOL TARTRATE 25 MG/1
25 TABLET, FILM COATED ORAL DAILY PRN
COMMUNITY
End: 2024-12-10 | Stop reason: ALTCHOICE
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
## 2024-12-10 NOTE — PROGRESS NOTES
"      CARDIOLOGY    Srini Rodríguez MD    ENCOUNTER DATE:  12/10/2024    Marni Danielle / 39 y.o. / female        CHIEF COMPLAINT / REASON FOR OFFICE VISIT     Palpitations      HISTORY OF PRESENT ILLNESS       Palpitations       Marni Danielle is a 39 y.o. female who presents today for consultation.  Patient been having episodes of fast heart rates.  Patient actually said it started back in high school.  She played high school basketball.  1 time she passed out she was evaluated and nothing was ever found.  Over the years she has had intermittent episodes of the palpitations.  Patient says the episodes can last anywhere from minutes to 2 hours.  She has tried vagal maneuvers with random success.  She says that when she gets the episodes she is very symptomatic she feels like she is going to pass out although she actually never hurt yourself.  She has had these awaken her from sleep.  She saw a cardiologist in [place] about 2 years ago for these similar episodes.      The following portions of the patient's history were reviewed and updated as appropriate: allergies, current medications, past family history, past medical history, past social history, past surgical history and problem list.      VITAL SIGNS     Visit Vitals  /78 (BP Location: Left arm)   Pulse 86   Ht 177.8 cm (70\")   Wt 83 kg (183 lb)   LMP 12/03/2024 (Exact Date)   SpO2 99%   BMI 26.26 kg/m²         Wt Readings from Last 3 Encounters:   12/10/24 83 kg (183 lb)   12/10/24 83.6 kg (184 lb 3.2 oz)   03/29/22 84.8 kg (187 lb)     Body mass index is 26.26 kg/m².      REVIEW OF SYSTEMS   Review of Systems   Cardiovascular:  Positive for palpitations.           PHYSICAL EXAMINATION     Vitals reviewed.   Constitutional:       Appearance: Healthy appearance.   Pulmonary:      Effort: Pulmonary effort is normal.   Cardiovascular:      Normal rate. Regular rhythm. Normal S1. Normal S2.       Murmurs: There is no murmur.      No gallop.  No " click. No rub.   Pulses:     Intact distal pulses.   Edema:     Peripheral edema absent.           REVIEWED DATA     Procedures    Cardiac Procedures:            ASSESSMENT & PLAN      Diagnosis Plan   1. Palpitations              SUMMARY/DISCUSSION  Recurrent episodes of palpitations.  Patient describes a classic SVT.  An EKG done on December 10, 2024 at 1519 at her PCP office shows RSR prime in V1 and V2.  This could represent a posterior accessory track.  She does have an Apple watch on unfortunately all she has is not capturing a heart rate which it did documented up to 203 bpm.  Her primary care provider placed a 48-hour Holter monitor.  Hopefully we can capture some other arrhythmias but she admits that they are very sporadic.  She does have ECG capability on her watch and she is going to get that set up.  All we need to do is document SVT and I can refer to EP and get her set up for an ablation.        MEDICATIONS         Discharge Medications            Accurate as of December 10, 2024  3:08 PM. If you have any questions, ask your nurse or doctor.                New Medications        Instructions Start Date   escitalopram 10 MG tablet  Commonly known as: Lexapro  Started by: Leia Via   10 mg, Oral, Daily      metoprolol succinate XL 25 MG 24 hr tablet  Commonly known as: Toprol XL  Started by: Leia Via   25 mg, Oral, Daily             Stop These Medications      metoprolol tartrate 25 MG tablet  Commonly known as: LOPRESSOR  Stopped by: Leia Via                  **Dragon Disclaimer:   Much of this encounter note is an electronic transcription/translation of spoken language to printed text. The electronic translation of spoken language may permit erroneous, or at times, nonsensical words or phrases to be inadvertently transcribed. Although I have reviewed the note for such errors, some may still exist.

## 2024-12-13 ENCOUNTER — OFFICE VISIT (OUTPATIENT)
Dept: OBSTETRICS AND GYNECOLOGY | Facility: CLINIC | Age: 39
End: 2024-12-13
Payer: COMMERCIAL

## 2024-12-13 VITALS
DIASTOLIC BLOOD PRESSURE: 70 MMHG | WEIGHT: 189 LBS | SYSTOLIC BLOOD PRESSURE: 124 MMHG | RESPIRATION RATE: 14 BRPM | BODY MASS INDEX: 27.12 KG/M2

## 2024-12-13 DIAGNOSIS — N92.0 MENORRHAGIA WITH REGULAR CYCLE: ICD-10-CM

## 2024-12-13 DIAGNOSIS — Z01.419 WELL WOMAN EXAM WITH ROUTINE GYNECOLOGICAL EXAM: Primary | ICD-10-CM

## 2024-12-13 DIAGNOSIS — D50.0 ANEMIA DUE TO CHRONIC BLOOD LOSS: ICD-10-CM

## 2024-12-13 PROBLEM — F41.9 ANXIETY: Status: ACTIVE | Noted: 2024-01-01

## 2024-12-13 LAB
ALBUMIN SERPL-MCNC: 4.2 G/DL (ref 3.5–5.2)
ALBUMIN/GLOB SERPL: 1.6 G/DL
ALP SERPL-CCNC: 66 U/L (ref 39–117)
ALT SERPL-CCNC: 11 U/L (ref 1–33)
AST SERPL-CCNC: 16 U/L (ref 1–32)
BASOPHILS # BLD AUTO: 0.06 10*3/MM3 (ref 0–0.2)
BASOPHILS NFR BLD AUTO: 0.8 % (ref 0–1.5)
BILIRUB SERPL-MCNC: 0.5 MG/DL (ref 0–1.2)
BUN SERPL-MCNC: 9 MG/DL (ref 6–20)
BUN/CREAT SERPL: 13.6 (ref 7–25)
CALCIUM SERPL-MCNC: 9.1 MG/DL (ref 8.6–10.5)
CHLORIDE SERPL-SCNC: 103 MMOL/L (ref 98–107)
CHOLEST SERPL-MCNC: 118 MG/DL (ref 0–200)
CO2 SERPL-SCNC: 24.3 MMOL/L (ref 22–29)
CREAT SERPL-MCNC: 0.66 MG/DL (ref 0.57–1)
EGFRCR SERPLBLD CKD-EPI 2021: 114.6 ML/MIN/1.73
EOSINOPHIL # BLD AUTO: 0.2 10*3/MM3 (ref 0–0.4)
EOSINOPHIL NFR BLD AUTO: 2.7 % (ref 0.3–6.2)
ERYTHROCYTE [DISTWIDTH] IN BLOOD BY AUTOMATED COUNT: 13 % (ref 12.3–15.4)
GLOBULIN SER CALC-MCNC: 2.7 GM/DL
GLUCOSE SERPL-MCNC: 87 MG/DL (ref 65–99)
HCT VFR BLD AUTO: 33.6 % (ref 34–46.6)
HDLC SERPL-MCNC: 47 MG/DL (ref 40–60)
HGB BLD-MCNC: 10.7 G/DL (ref 12–15.9)
IMM GRANULOCYTES # BLD AUTO: 0.04 10*3/MM3 (ref 0–0.05)
IMM GRANULOCYTES NFR BLD AUTO: 0.5 % (ref 0–0.5)
LDLC SERPL CALC-MCNC: 60 MG/DL (ref 0–100)
LYMPHOCYTES # BLD AUTO: 1.34 10*3/MM3 (ref 0.7–3.1)
LYMPHOCYTES NFR BLD AUTO: 18.4 % (ref 19.6–45.3)
MCH RBC QN AUTO: 25.7 PG (ref 26.6–33)
MCHC RBC AUTO-ENTMCNC: 31.8 G/DL (ref 31.5–35.7)
MCV RBC AUTO: 80.8 FL (ref 79–97)
MONOCYTES # BLD AUTO: 0.66 10*3/MM3 (ref 0.1–0.9)
MONOCYTES NFR BLD AUTO: 9 % (ref 5–12)
NEUTROPHILS # BLD AUTO: 5 10*3/MM3 (ref 1.7–7)
NEUTROPHILS NFR BLD AUTO: 68.6 % (ref 42.7–76)
NRBC BLD AUTO-RTO: 0 /100 WBC (ref 0–0.2)
PLATELET # BLD AUTO: 399 10*3/MM3 (ref 140–450)
POTASSIUM SERPL-SCNC: 4.1 MMOL/L (ref 3.5–5.2)
PROT SERPL-MCNC: 6.9 G/DL (ref 6–8.5)
RBC # BLD AUTO: 4.16 10*6/MM3 (ref 3.77–5.28)
SODIUM SERPL-SCNC: 141 MMOL/L (ref 136–145)
T3FREE SERPL-MCNC: 3.1 PG/ML (ref 2–4.4)
T4 FREE SERPL-MCNC: 1.22 NG/DL (ref 0.92–1.68)
TRIGL SERPL-MCNC: 46 MG/DL (ref 0–150)
TSH SERPL DL<=0.005 MIU/L-ACNC: 0.65 UIU/ML (ref 0.27–4.2)
VLDLC SERPL CALC-MCNC: 11 MG/DL (ref 5–40)
WBC # BLD AUTO: 7.3 10*3/MM3 (ref 3.4–10.8)

## 2024-12-13 PROCEDURE — 99395 PREV VISIT EST AGE 18-39: CPT | Performed by: OBSTETRICS & GYNECOLOGY

## 2024-12-13 RX ORDER — METOPROLOL SUCCINATE 25 MG/1
TABLET, EXTENDED RELEASE ORAL
COMMUNITY
Start: 2024-12-10
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
## 2024-12-16 LAB — REF LAB TEST METHOD: NORMAL

## 2024-12-17 ENCOUNTER — TELEPHONE (OUTPATIENT)
Dept: CARDIOLOGY | Facility: CLINIC | Age: 39
End: 2024-12-17
Payer: COMMERCIAL

## 2024-12-17 NOTE — TELEPHONE ENCOUNTER
"    Caller: Marni Danielle \"Ginger\"    Relationship to patient: Self    Best call back number: [phone]     Patient is needing: PT STATED DR CHEN WANTS HER TO SEE DR WALKER. THERE IS NO REFERRAL SUBMITTED FOR DR WALKER. PLS CALL PT TO SCHEDULE.           "

## 2024-12-24 DIAGNOSIS — I47.10 PAROXYSMAL SVT (SUPRAVENTRICULAR TACHYCARDIA): Primary | ICD-10-CM

## 2025-01-12 ENCOUNTER — TELEPHONE (OUTPATIENT)
Dept: OBSTETRICS AND GYNECOLOGY | Facility: CLINIC | Age: 40
End: 2025-01-12
Payer: COMMERCIAL

## 2025-01-12 DIAGNOSIS — N92.0 MENORRHAGIA WITH REGULAR CYCLE: Primary | ICD-10-CM

## 2025-01-12 NOTE — TELEPHONE ENCOUNTER
Please call Ginger and let her know that her ultrasound is consistent with a suspected endometrial polyp.  I would like to get a see on infusion sonogram to confirm this.  Assuming this is present, outpatient D&C would most likely resolve her bleeding issues.

## 2025-01-13 NOTE — TELEPHONE ENCOUNTER
Pt had questions about saline infusion sonogram but there was a bad connection. Unable to reach at return calls, will call her again later today.

## 2025-01-13 NOTE — TELEPHONE ENCOUNTER
Pt was wanting to know if D&C was advised it will be on a separate day and demonstrates understanding and appreciation.

## 2025-01-17 ENCOUNTER — OFFICE VISIT (OUTPATIENT)
Age: 40
End: 2025-01-17
Payer: COMMERCIAL

## 2025-01-17 VITALS
HEIGHT: 70 IN | SYSTOLIC BLOOD PRESSURE: 126 MMHG | BODY MASS INDEX: 27.06 KG/M2 | WEIGHT: 189 LBS | DIASTOLIC BLOOD PRESSURE: 88 MMHG | HEART RATE: 67 BPM

## 2025-01-17 DIAGNOSIS — I47.10 PSVT (PAROXYSMAL SUPRAVENTRICULAR TACHYCARDIA): Primary | ICD-10-CM

## 2025-01-17 NOTE — PROGRESS NOTES
Date of Office Visit: 2025  Encounter Provider: Steve Zavaleta MD  Place of Service: [facility] CARDIOLOGY  Patient Name: Marni Danielle  : 1985    Subjective:     Encounter Date:2025      Patient ID: Marni Danielle is a 39 y.o. female who has a cc of  PSVT and she has episodes. Random. Sometimes get it at night.     Last hours. Often weekly Stops with vagal manuevers   When not in tachycardia   No anginal chest pain,   No sig grande,   No soa,   No fainting,  No orthostasis.   No edema.   Exercise tolerance: good     There have been no hospital admission since the last visit.     There have been no bleeding events.       Past Medical History:   Diagnosis Date    Anxiety 2024    Palpitations 2022       Social History     Socioeconomic History    Marital status:    Tobacco Use    Smoking status: Never    Smokeless tobacco: Never   Vaping Use    Vaping status: Never Used   Substance and Sexual Activity    Alcohol use: Yes     Alcohol/week: 3.0 - 5.0 standard drinks of alcohol     Types: 1 - 2 Glasses of wine, 2 - 3 Shots of liquor per week     Comment: Occasional social drinks very limited    Drug use: No    Sexual activity: Yes     Partners: Male     Birth control/protection: Vasectomy, Essure       Family History   Problem Relation Age of Onset    No Known Problems Father     Hyperlipidemia Mother     No Known Problems Brother     No Known Problems Sister     Birth defects Son         Brain tumor at birth    Developmental Disability Son         Brain tumor/surgery    Learning disabilities Son         Due to brain tumor, surgery and bleeding out    Cancer Paternal Grandfather     Breast cancer Paternal Grandmother     Arthritis Maternal Grandmother     Diabetes Maternal Grandmother     Heart disease Maternal Grandmother     Hypertension Maternal Grandfather     Hyperlipidemia Paternal Uncle     Hyperlipidemia Paternal Uncle        Review of Systems  "  Constitutional: Negative for fever and night sweats.   HENT:  Negative for ear pain and stridor.    Eyes:  Negative for discharge and visual halos.   Cardiovascular:  Negative for cyanosis.   Respiratory:  Negative for hemoptysis and sputum production.    Hematologic/Lymphatic: Negative for adenopathy.   Skin:  Negative for nail changes and unusual hair distribution.   Musculoskeletal:  Negative for gout and joint swelling.   Gastrointestinal:  Negative for bowel incontinence and flatus.   Genitourinary:  Negative for dysuria and flank pain.   Neurological:  Negative for seizures and tremors.   Psychiatric/Behavioral:  Negative for altered mental status. The patient is not nervous/anxious.             Objective:     Vitals:    01/17/25 1257   BP: 126/88   BP Location: Right arm   Patient Position: Sitting   Pulse: 67   Weight: 85.7 kg (189 lb)   Height: 177.8 cm (70\")         Eyes:      General:         Right eye: No discharge.         Left eye: No discharge.   HENT:      Head: Normocephalic and atraumatic.   Neck:      Thyroid: No thyromegaly.      Vascular: No JVD.   Pulmonary:      Effort: Pulmonary effort is normal.      Breath sounds: Normal breath sounds. No rales.   Cardiovascular:      Normal rate. Regular rhythm.      No gallop.    Edema:     Peripheral edema absent.   Abdominal:      General: Bowel sounds are normal.      Palpations: Abdomen is soft.      Tenderness: There is no abdominal tenderness.   Musculoskeletal: Normal range of motion.         General: No deformity. Skin:     General: Skin is warm and dry.      Findings: No erythema.   Neurological:      Mental Status: Alert and oriented to person, place, and time.      Motor: Normal muscle tone.   Psychiatric:         Behavior: Behavior normal.         Thought Content: Thought content normal.           ECG 12 Lead    Date/Time: 1/17/2025 1:17 PM  Performed by: Steve Zavaleta MD    Authorized by: Steve Zavaleta MD  Comparison: compared with " previous ECG   Similar to previous ECG  Rhythm: sinus rhythm  Rate: normal  Conduction: conduction normal  ST Segments: ST segments normal  T Waves: T waves normal  QRS axis: normal    Clinical impression: normal ECG          Lab Review:       Assessment:          Diagnosis Plan   1. PSVT (paroxysmal supraventricular tachycardia)               Plan:     I think she is a great candidate for ablation    Strips show SVT. History and strip makes me think left sided AP but we shall see    Risks discussed

## 2025-01-20 ENCOUNTER — TRANSCRIBE ORDERS (OUTPATIENT)
Dept: CARDIOLOGY | Facility: CLINIC | Age: 40
End: 2025-01-20
Payer: COMMERCIAL

## 2025-01-20 DIAGNOSIS — Z13.6 SCREENING FOR ISCHEMIC HEART DISEASE: ICD-10-CM

## 2025-01-20 DIAGNOSIS — Z01.810 PRE-OPERATIVE CARDIOVASCULAR EXAMINATION: Primary | ICD-10-CM

## 2025-01-21 ENCOUNTER — OFFICE VISIT (OUTPATIENT)
Dept: FAMILY MEDICINE CLINIC | Facility: CLINIC | Age: 40
End: 2025-01-21
Payer: COMMERCIAL

## 2025-01-21 VITALS
TEMPERATURE: 98.2 F | BODY MASS INDEX: 28.03 KG/M2 | DIASTOLIC BLOOD PRESSURE: 82 MMHG | OXYGEN SATURATION: 100 % | WEIGHT: 195.8 LBS | HEART RATE: 71 BPM | HEIGHT: 70 IN | SYSTOLIC BLOOD PRESSURE: 110 MMHG

## 2025-01-21 DIAGNOSIS — F41.9 ANXIETY: Primary | ICD-10-CM

## 2025-01-21 DIAGNOSIS — I47.10 PSVT (PAROXYSMAL SUPRAVENTRICULAR TACHYCARDIA): ICD-10-CM

## 2025-01-21 PROCEDURE — 99213 OFFICE O/P EST LOW 20 MIN: CPT

## 2025-01-21 NOTE — PROGRESS NOTES
"Chief Complaint  Anxiety    Subjective          Anxiety   Symptoms include nervous/anxious behavior.  Patient reports no chest pain, nausea or suicidal ideas.     History of Present Illness  Marni Danielle 39 y.o. female presents for a follow-up on anxiety.    Ms. Danielle was previously evaluated by Dr. Rodríguez, Cardiology, who recommended postponing the initiation of anxiety medication until the cardiac ablation has been performed. She was referred to Dr. Rodríguez due to tachycardia, palpitations, and abnormal ECG.  She was evaluated by Dr. Rodríguez, and subsequently referred to Dr. Zavaleta, Electrophysiology due to paroxysmal supraventricular tachycardia.    She has a heart ablation procedure scheduled for 3/6/2025. She believes that addressing her cardiac issues will help alleviate her anxiety. She has been managing her symptoms without medication and has been proactive in recording any cardiac-related events. She is open to the idea of therapy, and is seeking recommendations for potential therapists.        Review of Systems   Constitutional:  Negative for chills, diaphoresis, fatigue and fever.   HENT:  Negative for trouble swallowing.    Respiratory:  Negative for cough.    Cardiovascular:  Negative for chest pain.   Gastrointestinal:  Negative for abdominal pain, nausea and vomiting.   Genitourinary:  Negative for dysuria.   Musculoskeletal:  Negative for myalgias and neck pain.   Skin:  Negative for rash.   Neurological:  Negative for weakness and numbness.   Psychiatric/Behavioral:  Negative for dysphoric mood and suicidal ideas. The patient is nervous/anxious.         Objective   Vital Signs:   /82 (BP Location: Left arm, Patient Position: Sitting, Cuff Size: Adult)   Pulse 71   Temp 98.2 °F (36.8 °C) (Oral)   Ht 177.8 cm (70\")   Wt 88.8 kg (195 lb 12.8 oz)   SpO2 100%   BMI 28.09 kg/m²        Physical Exam  Vitals and nursing note reviewed.   Constitutional:       General: She is not in acute " distress.     Appearance: She is well-developed and overweight.   HENT:      Head: Normocephalic.   Eyes:      General: No scleral icterus.     Pupils: Pupils are equal, round, and reactive to light.   Neck:      Vascular: No carotid bruit.   Cardiovascular:      Rate and Rhythm: Normal rate and regular rhythm.      Pulses: Normal pulses.      Heart sounds: Normal heart sounds.   Pulmonary:      Effort: Pulmonary effort is normal. No respiratory distress.      Breath sounds: Normal breath sounds. No stridor.   Skin:     General: Skin is warm.   Neurological:      General: No focal deficit present.      Mental Status: She is alert and oriented to person, place, and time.      Cranial Nerves: No dysarthria.   Psychiatric:         Mood and Affect: Mood is not anxious or depressed.         Behavior: Behavior normal.         Thought Content: Thought content normal. Thought content does not include homicidal or suicidal ideation. Thought content does not include homicidal or suicidal plan.         Judgment: Judgment normal.        Physical Exam  Lungs are clear.  Heart sounds regular.      Results                 Assessment and Plan    Assessment & Plan  1. Anxiety.  Her anxiety is likely exacerbated by her current cardiac condition. She has been advised to establish mental health barriers and avoid situations that trigger her anxiety. A list of potential therapists, counselors, and Psychiatry groups has been provided today as an alternative to medication during this interim period. If she decides to pursue medication in the future, it will be done in consultation with Dr. Rodríguez, Cardiology.    2.  Paroxysmal supraventricular tachycardia.  She is scheduled for a heart ablation procedure in March 2025. She will continue to monitor heart rate and rhythm through her Apple Watch, and report abnormal readings to Dr. Rodríguez. She has been advised to complete all cardiac testing and procedures before reassessing her anxiety  treatment.    Assessment & Plan  Anxiety         PSVT (paroxysmal supraventricular tachycardia)                  Follow Up     Return if symptoms worsen or fail to improve.    Patient or patient representative verbalized consent for the use of Ambient Listening during the visit with  SHEBA Jackson for chart documentation. 1/21/2025  11:45 EST    Patient was given instructions and counseling regarding her condition or for health maintenance advice. Please see specific information pulled into the AVS if appropriate.

## 2025-02-27 ENCOUNTER — LAB (OUTPATIENT)
Dept: LAB | Facility: HOSPITAL | Age: 40
End: 2025-02-27
Payer: COMMERCIAL

## 2025-02-27 DIAGNOSIS — Z13.6 SCREENING FOR ISCHEMIC HEART DISEASE: ICD-10-CM

## 2025-02-27 DIAGNOSIS — Z01.810 PRE-OPERATIVE CARDIOVASCULAR EXAMINATION: ICD-10-CM

## 2025-02-27 LAB
ANION GAP SERPL CALCULATED.3IONS-SCNC: 12.2 MMOL/L (ref 5–15)
BUN SERPL-MCNC: 12 MG/DL (ref 6–20)
BUN/CREAT SERPL: 20 (ref 7–25)
CALCIUM SPEC-SCNC: 8.7 MG/DL (ref 8.6–10.5)
CHLORIDE SERPL-SCNC: 108 MMOL/L (ref 98–107)
CO2 SERPL-SCNC: 22.8 MMOL/L (ref 22–29)
CREAT SERPL-MCNC: 0.6 MG/DL (ref 0.57–1)
DEPRECATED RDW RBC AUTO: 39.7 FL (ref 37–54)
EGFRCR SERPLBLD CKD-EPI 2021: 117.3 ML/MIN/1.73
ERYTHROCYTE [DISTWIDTH] IN BLOOD BY AUTOMATED COUNT: 14.5 % (ref 12.3–15.4)
GLUCOSE SERPL-MCNC: 91 MG/DL (ref 65–99)
HCT VFR BLD AUTO: 34.2 % (ref 34–46.6)
HGB BLD-MCNC: 9.8 G/DL (ref 12–15.9)
MCH RBC QN AUTO: 21.8 PG (ref 26.6–33)
MCHC RBC AUTO-ENTMCNC: 28.7 G/DL (ref 31.5–35.7)
MCV RBC AUTO: 76.2 FL (ref 79–97)
PLATELET # BLD AUTO: 429 10*3/MM3 (ref 140–450)
PMV BLD AUTO: 9.6 FL (ref 6–12)
POTASSIUM SERPL-SCNC: 3.8 MMOL/L (ref 3.5–5.2)
RBC # BLD AUTO: 4.49 10*6/MM3 (ref 3.77–5.28)
SODIUM SERPL-SCNC: 143 MMOL/L (ref 136–145)
WBC NRBC COR # BLD AUTO: 4.43 10*3/MM3 (ref 3.4–10.8)

## 2025-02-27 PROCEDURE — 36415 COLL VENOUS BLD VENIPUNCTURE: CPT

## 2025-02-27 PROCEDURE — 80048 BASIC METABOLIC PNL TOTAL CA: CPT

## 2025-02-27 PROCEDURE — 85027 COMPLETE CBC AUTOMATED: CPT

## 2025-02-27 NOTE — PROGRESS NOTES
Microcytic Anemic. Hx of menorrhagia and she just finished cycle day of this test. We can repeat cbc when she comes in. Instructed to start eating diet with high iron foods and take iron supplement that she has

## 2025-03-06 ENCOUNTER — HOSPITAL ENCOUNTER (OUTPATIENT)
Facility: HOSPITAL | Age: 40
Setting detail: HOSPITAL OUTPATIENT SURGERY
Discharge: HOME | End: 2025-03-06
Attending: INTERNAL MEDICINE | Admitting: INTERNAL MEDICINE
Payer: COMMERCIAL

## 2025-03-06 VITALS
HEART RATE: 85 BPM | WEIGHT: 190 LBS | BODY MASS INDEX: 27.2 KG/M2 | HEIGHT: 70 IN | DIASTOLIC BLOOD PRESSURE: 73 MMHG | OXYGEN SATURATION: 100 % | RESPIRATION RATE: 15 BRPM | SYSTOLIC BLOOD PRESSURE: 106 MMHG | TEMPERATURE: 97.2 F

## 2025-03-06 DIAGNOSIS — I47.10 PSVT (PAROXYSMAL SUPRAVENTRICULAR TACHYCARDIA): ICD-10-CM

## 2025-03-06 LAB
ACT BLD: 239 SECONDS (ref 82–152)
ACT BLD: 256 SECONDS (ref 82–152)
B-HCG UR QL: NEGATIVE
EXPIRATION DATE: NORMAL
INTERNAL NEGATIVE CONTROL: NEGATIVE
INTERNAL POSITIVE CONTROL: POSITIVE
Lab: NORMAL
QT INTERVAL: 410 MS
QT INTERVAL: 418 MS
QTC INTERVAL: 435 MS
QTC INTERVAL: 440 MS

## 2025-03-06 PROCEDURE — C1894 INTRO/SHEATH, NON-LASER: HCPCS | Performed by: INTERNAL MEDICINE

## 2025-03-06 PROCEDURE — 93462 L HRT CATH TRNSPTL PUNCTURE: CPT | Performed by: INTERNAL MEDICINE

## 2025-03-06 PROCEDURE — 25810000003 SODIUM CHLORIDE 0.9 % SOLUTION: Performed by: INTERNAL MEDICINE

## 2025-03-06 PROCEDURE — S0260 H&P FOR SURGERY: HCPCS | Performed by: INTERNAL MEDICINE

## 2025-03-06 PROCEDURE — C1730 CATH, EP, 19 OR FEW ELECT: HCPCS | Performed by: INTERNAL MEDICINE

## 2025-03-06 PROCEDURE — 93653 COMPRE EP EVAL TX SVT: CPT | Performed by: INTERNAL MEDICINE

## 2025-03-06 PROCEDURE — 25010000002 HEPARIN (PORCINE) PER 1000 UNITS: Performed by: INTERNAL MEDICINE

## 2025-03-06 PROCEDURE — 93622 COMP EP EVAL L VENTR PAC&REC: CPT | Performed by: INTERNAL MEDICINE

## 2025-03-06 PROCEDURE — 93010 ELECTROCARDIOGRAM REPORT: CPT | Performed by: INTERNAL MEDICINE

## 2025-03-06 PROCEDURE — C1732 CATH, EP, DIAG/ABL, 3D/VECT: HCPCS | Performed by: INTERNAL MEDICINE

## 2025-03-06 PROCEDURE — C1893 INTRO/SHEATH, FIXED,NON-PEEL: HCPCS | Performed by: INTERNAL MEDICINE

## 2025-03-06 PROCEDURE — 81025 URINE PREGNANCY TEST: CPT | Performed by: INTERNAL MEDICINE

## 2025-03-06 PROCEDURE — 85347 COAGULATION TIME ACTIVATED: CPT

## 2025-03-06 PROCEDURE — 25010000002 MIDAZOLAM PER 1 MG: Performed by: INTERNAL MEDICINE

## 2025-03-06 PROCEDURE — 25010000002 FENTANYL CITRATE (PF) 50 MCG/ML SOLUTION: Performed by: INTERNAL MEDICINE

## 2025-03-06 PROCEDURE — 93005 ELECTROCARDIOGRAM TRACING: CPT | Performed by: INTERNAL MEDICINE

## 2025-03-06 PROCEDURE — 25010000002 LIDOCAINE-EPINEPHRINE (PF) 1 %-1:200000 SOLUTION: Performed by: INTERNAL MEDICINE

## 2025-03-06 RX ORDER — ACETAMINOPHEN 325 MG/1
650 TABLET ORAL EVERY 4 HOURS PRN
Status: DISCONTINUED | OUTPATIENT
Start: 2025-03-06 | End: 2025-03-06 | Stop reason: HOSPADM

## 2025-03-06 RX ORDER — ACETAMINOPHEN 650 MG/1
650 SUPPOSITORY RECTAL EVERY 4 HOURS PRN
Status: DISCONTINUED | OUTPATIENT
Start: 2025-03-06 | End: 2025-03-06 | Stop reason: HOSPADM

## 2025-03-06 RX ORDER — FENTANYL CITRATE 50 UG/ML
INJECTION, SOLUTION INTRAMUSCULAR; INTRAVENOUS
Status: DISCONTINUED | OUTPATIENT
Start: 2025-03-06 | End: 2025-03-06 | Stop reason: HOSPADM

## 2025-03-06 RX ORDER — SODIUM CHLORIDE 0.9 % (FLUSH) 0.9 %
10 SYRINGE (ML) INJECTION AS NEEDED
Status: DISCONTINUED | OUTPATIENT
Start: 2025-03-06 | End: 2025-03-06 | Stop reason: HOSPADM

## 2025-03-06 RX ORDER — ASPIRIN 81 MG/1
81 TABLET ORAL DAILY
Qty: 30 TABLET | Refills: 0 | Status: SHIPPED | OUTPATIENT
Start: 2025-03-06

## 2025-03-06 RX ORDER — HEPARIN SODIUM 1000 [USP'U]/ML
INJECTION, SOLUTION INTRAVENOUS; SUBCUTANEOUS
Status: DISCONTINUED | OUTPATIENT
Start: 2025-03-06 | End: 2025-03-06 | Stop reason: HOSPADM

## 2025-03-06 RX ORDER — METHOHEXITAL IN WATER/PF 100MG/10ML
SYRINGE (ML) INTRAVENOUS
Status: DISCONTINUED | OUTPATIENT
Start: 2025-03-06 | End: 2025-03-06 | Stop reason: HOSPADM

## 2025-03-06 RX ORDER — SODIUM CHLORIDE 9 MG/ML
75 INJECTION, SOLUTION INTRAVENOUS CONTINUOUS
Status: ACTIVE | OUTPATIENT
Start: 2025-03-06 | End: 2025-03-06

## 2025-03-06 RX ORDER — MIDAZOLAM HYDROCHLORIDE 1 MG/ML
INJECTION, SOLUTION INTRAMUSCULAR; INTRAVENOUS
Status: DISCONTINUED | OUTPATIENT
Start: 2025-03-06 | End: 2025-03-06 | Stop reason: HOSPADM

## 2025-03-06 RX ADMIN — SODIUM CHLORIDE 75 ML/HR: 9 INJECTION, SOLUTION INTRAVENOUS at 08:15

## 2025-03-06 NOTE — DISCHARGE INSTRUCTIONS
[facility]  Cardiology  [address] [place] [address]  [phone]    Coronary Ablation After Care    Refer to this sheet in the next few weeks. These instructions provide you with information on caring for yourself after your procedure. Your health care provider may also give you more specific instructions. Your treatment has been planned according to current medical practices, but problems sometimes occur. Call your health care provider if you have any problems or questions after your procedure.      What to Expect After the Procedure:  After your procedure, it is typical to have the following sensations:  Minor discomfort or tenderness and a small bump at the catheter insertion site(s). The bump(s) should usually decrease in size and tenderness within 1 to 2 weeks.  Any bruising will usually fade within 2 to 4 weeks.  Home Care Instructions:  Do not apply powder or lotion to the site.  Do not take baths, swim, or use a hot tub until your health care provider approves and the site is completely healed.  Do not bend, squat, or lift anything over 20 lb (9 kg) or as directed by your health care provider. However, we recommend lifting nothing heavier than a gallon of milk.    You may shower 24 hours after the procedure. Remove the bandage (dressing) and gently wash the site with plain soap and water. Gently pat the site dry. You may apply a band aid daily for 2 days if desired.    Inspect the site at least twice daily.  Increase your fluid intake for the next 2 days.    Limit your activity for the first 48 hours.   Avoid strenuous activity for 1 week or as advised by your physician.    Follow instructions about when you can drive or return to work as directed by your physician.    Hold direct pressure over the site when you cough, sneeze, laugh or change positions.  Do this for the next 2 days.    Call Your Doctor If:  You have drainage (other than a small amount of blood on the dressing).  You  have chills or a fever > 101.  You have redness, warmth, swelling (larger than a walnut), or pain at the insertion   You develop palpitations, chest pain or shortness of breath, feel faint, or pass out.  You develop pain, discoloration, coldness, numbness, tingling, or severe bruising in the leg that held the catheter.  You develop bleeding from any other place, such as the bowels.  You have heavy bleeding from the site.  If this happens, hold pressure on the site and call 911.        Make Sure You:  Understand these instructions.  Will watch your condition.  Will get help right away if you are not doing well or get worse.

## 2025-03-06 NOTE — Clinical Note
Hemostasis started on the right femoral vein. Manual pressure applied to vessel. Manual pressure was held by JAQUAN YADAV. Manual pressure was held for 5 min. Hemostasis achieved successfully. Closure device additional comment: Pressure held, gauze and tegaderm

## 2025-03-06 NOTE — Clinical Note
Allergies reviewed.  H&P note has been confirmed for the patient. Procedural consent has been signed.  Staff has reviewed the patient's labs.  Urine HCG negative The patient has denied she is pregnant.

## 2025-03-06 NOTE — H&P
Marni Danielle is a 39 y.o. female who has a cc of  PSVT and she has episodes. Random. Sometimes get it at night.      Last hours. Often weekly Stops with vagal manuevers   When not in tachycardia   No anginal chest pain,   No sig grande,   No soa,   No fainting,  No orthostasis.   No edema.   Exercise tolerance: good     There have been no hospital admission since the last visit.      There have been no bleeding events.         Medical History        Past Medical History:   Diagnosis Date    Anxiety 2024    Palpitations 01/24/2022            Social History   Social History            Socioeconomic History    Marital status:    Tobacco Use    Smoking status: Never    Smokeless tobacco: Never   Vaping Use    Vaping status: Never Used   Substance and Sexual Activity    Alcohol use: Yes       Alcohol/week: 3.0 - 5.0 standard drinks of alcohol       Types: 1 - 2 Glasses of wine, 2 - 3 Shots of liquor per week       Comment: Occasional social drinks very limited    Drug use: No    Sexual activity: Yes       Partners: Male       Birth control/protection: Vasectomy, Essure                  Family History   Problem Relation Age of Onset    No Known Problems Father      Hyperlipidemia Mother      No Known Problems Brother      No Known Problems Sister      Birth defects Son           Brain tumor at birth    Developmental Disability Son           Brain tumor/surgery    Learning disabilities Son           Due to brain tumor, surgery and bleeding out    Cancer Paternal Grandfather      Breast cancer Paternal Grandmother      Arthritis Maternal Grandmother      Diabetes Maternal Grandmother      Heart disease Maternal Grandmother      Hypertension Maternal Grandfather      Hyperlipidemia Paternal Uncle      Hyperlipidemia Paternal Uncle           Review of Systems   Constitutional: Negative for fever and night sweats.   HENT:  Negative for ear pain and stridor.    Eyes:  Negative for discharge and visual halos.    Cardiovascular:  Negative for cyanosis.   Respiratory:  Negative for hemoptysis and sputum production.    Hematologic/Lymphatic: Negative for adenopathy.   Skin:  Negative for nail changes and unusual hair distribution.   Musculoskeletal:  Negative for gout and joint swelling.   Gastrointestinal:  Negative for bowel incontinence and flatus.   Genitourinary:  Negative for dysuria and flank pain.   Neurological:  Negative for seizures and tremors.   Psychiatric/Behavioral:  Negative for altered mental status. The patient is not nervous/anxious.                      Objective  Eyes:      General:         Right eye: No discharge.         Left eye: No discharge.   HENT:      Head: Normocephalic and atraumatic.   Neck:      Thyroid: No thyromegaly.      Vascular: No JVD.   Pulmonary:      Effort: Pulmonary effort is normal.      Breath sounds: Normal breath sounds. No rales.   Cardiovascular:      Normal rate. Regular rhythm.      No gallop.    Edema:     Peripheral edema absent.   Abdominal:      General: Bowel sounds are normal.      Palpations: Abdomen is soft.      Tenderness: There is no abdominal tenderness.   Musculoskeletal: Normal range of motion.         General: No deformity. Skin:     General: Skin is warm and dry.      Findings: No erythema.   Neurological:      Mental Status: Alert and oriented to person, place, and time.      Motor: Normal muscle tone.   Psychiatric:         Behavior: Behavior normal.         Thought Content: Thought content normal.                     Lab Review:         Assessment:      Assessment    Diagnosis Plan   1. PSVT (paroxysmal supraventricular tachycardia)                      Plan:      I think she is a great candidate for ablation    Strips show SVT. History and strip makes me think left sided AP but we shall see    Risks discussed

## 2025-03-06 NOTE — Clinical Note
using micropuncture technique with ultrasound guidance into the right femoral vein. Sheath insertion delayed.

## 2025-03-06 NOTE — Clinical Note
07/17/22 1649   NIV Type   $NIV $Daily Charge   NIV Started/Stopped On   Equipment Type V60   Mode Bilevel   Mask Type Full face mask   Mask Size Medium   Settings/Measurements   IPAP 10 cmH20   CPAP/EPAP 5 cmH2O   Rate Ordered 12   Resp 27   FiO2  40 %   Vt Exhaled 375 mL   Mask Leak (lpm) 54 lpm   Comfort Level Good   Using Accessory Muscles No   SpO2 92   Alarm Settings   Alarms On Y   Low Pressure 6 cmH2O   High Pressure  30 cmH2O   Apnea (secs) 20 secs   RR Low  6   RR High 40 br/min Catheter located in the coronary sinus.

## 2025-03-06 NOTE — Clinical Note
Hemostasis started on the left femoral vein. Manual pressure applied to vessel. Manual pressure was held by JAQUAN YADAV. Manual pressure was held for 5 min. Hemostasis achieved successfully. Closure device additional comment: Pressure held, gauze and tegaderm

## 2025-03-08 NOTE — PROGRESS NOTES
Saline Infusion Sonogram     Date of procedure:  March 11, 2025     Risks and benefits were discussed.  All questions were answered.  Consents given by the patient verbally.     Pre-op indication:  Menorrhagia  Suspected endometrial polyp     Procedure documentation:    After the patient was identified and informed consent given she was placed in dorsal lithotomy position in stirrups and draped. A sterile speculum was placed inside the vagina with good visualization of the cervix and the cervix was cleaned with Betadine swabs.  The cervix was grasped with a single-tooth tenaculum.  A balloon catheter was introduced through the cervix without complication and inflated. The speculum was removed. The uterine cavity was then evaluated with transvaginal ultrasonography while saline was being instilled.    The findings were as follows:  The bilayer endometrial stripe measured < 4 mm.  Focal lesions were present posteriorly.  A pretty typical benign-appearing polyp is present    The balloon was then released and the cavity was then drained of saline and the catheter was removed. Scant bleeding was noted from the cervical lip and the procedure was completed. The patient tolerated the procedure well and was given follow-up instructions.      Impression: Endometrial polyp   Plan: Hysteroscopic evalulation of the cavity with probable Myosure     This note was electronically signed.    Ciaran Burdick M.D.  March 11, 2025

## 2025-03-11 ENCOUNTER — OFFICE VISIT (OUTPATIENT)
Dept: OBSTETRICS AND GYNECOLOGY | Facility: CLINIC | Age: 40
End: 2025-03-11
Payer: COMMERCIAL

## 2025-03-11 DIAGNOSIS — N92.1 METRORRHAGIA: Primary | ICD-10-CM

## 2025-04-09 ENCOUNTER — OFFICE VISIT (OUTPATIENT)
Age: 40
End: 2025-04-09
Payer: COMMERCIAL

## 2025-04-09 VITALS
BODY MASS INDEX: 28.86 KG/M2 | SYSTOLIC BLOOD PRESSURE: 126 MMHG | DIASTOLIC BLOOD PRESSURE: 78 MMHG | WEIGHT: 201.6 LBS | HEIGHT: 70 IN | HEART RATE: 79 BPM

## 2025-04-09 DIAGNOSIS — I47.10 PSVT (PAROXYSMAL SUPRAVENTRICULAR TACHYCARDIA): Primary | ICD-10-CM

## 2025-04-09 DIAGNOSIS — Z98.890 H/O CARDIAC RADIOFREQUENCY ABLATION: ICD-10-CM

## 2025-04-09 RX ORDER — SEMAGLUTIDE 0.5 MG/.5ML
INJECTION, SOLUTION SUBCUTANEOUS
COMMUNITY
Start: 2025-03-21

## 2025-04-09 NOTE — PROGRESS NOTES
"      ELECTROPHYSIOLOGY   Date of Office Visit: 2025  Patient Name: Marni Danielle  : 1985  Encounter Provider: Al Hurley PA-C  Primary Cardiologist: Srini Rodríguez MD  Electrophysiologist: Dr. Zavaleta  CHIEF COMPLAINT / REASON FOR OFFICE VISIT     Hospital Follow Up Visit (1 mth- s/p Ablation/) and PSVT  Hospital follow up     HISTORY OF PRESENT ILLNESS     This is a 39 y.o. year old female who presents to [facility] CARDIOLOGY for a for a procedure follow up.     She has a history of symptomatic paroxysmal supraventricular tachycardia.  Episode started in high school.  Symptoms did not have any perceivable triggers and they occurred randomly whether at night or during the day.    She saw Dr. Zavaleta in January and due to ongoing symptoms she was recommended for an ablation.    S/p SVT ablation of orthodromic AVRT with left posterior AP as the retrograde limb 3/6/2025    Today the patient reports since the procedure she has been doing really well.  She has not had any sustained episodes of palpitations.  Sometimes she will feel like a skipped beat her in her chest but nothing that sustained.  She is slowly easing herself back into getting her exercise routine with yoga and Pilates.    She has denied any chest pain, dizziness, headedness or near syncope.    PMHx: SVT s/p orthodromic AVRT ablation 3/2025    PHYSICAL EXAMINATION     Vital Signs:  /78 (BP Location: Right arm, Patient Position: Sitting, Cuff Size: Adult)   Pulse 79   Ht 177.8 cm (70\")   Wt 91.4 kg (201 lb 9.6 oz)   BMI 28.93 kg/m²   Estimated body mass index is 28.93 kg/m² as calculated from the following:    Height as of this encounter: 177.8 cm (70\").    Weight as of this encounter: 91.4 kg (201 lb 9.6 oz).               Physical Exam  Constitutional:       Appearance: Normal appearance.   HENT:      Head: Normocephalic and atraumatic.   Cardiovascular:      Rate and Rhythm: Normal rate and " "regular rhythm.      Pulses: Normal pulses.      Heart sounds: Normal heart sounds.   Pulmonary:      Effort: Pulmonary effort is normal.      Breath sounds: Normal breath sounds.   Musculoskeletal:      Right lower leg: No edema.      Left lower leg: No edema.   Skin:     General: Skin is warm and dry.   Neurological:      General: No focal deficit present.      Mental Status: She is alert and oriented to person, place, and time.          Cardiac Testing/Results     Cardiac Testing:   - Echo 1/2022: EF 5660% with normal systolic function.  Normal diastolic function with no significant valvular heart disease    Result Review :  The following data was reviewed by: Al Hurley PA-C on 04/09/2025:    Lipid Panel          12/12/2024    10:49   Lipid Panel   Total Cholesterol 118    Triglycerides 46    HDL Cholesterol 47    VLDL Cholesterol 11    LDL Cholesterol  60       Lab Results   Component Value Date     02/27/2025     12/12/2024    K 3.8 02/27/2025    K 4.1 12/12/2024     (H) 02/27/2025     12/12/2024    CO2 22.8 02/27/2025    CO2 24.3 12/12/2024    BUN 12 02/27/2025    BUN 9 12/12/2024    CREATININE 0.60 02/27/2025    CREATININE 0.66 12/12/2024    GLUCOSE 91 02/27/2025    GLUCOSE 87 12/12/2024    CALCIUM 8.7 02/27/2025    CALCIUM 9.1 12/12/2024    ALBUMIN 4.2 12/12/2024    AST 16 12/12/2024    ALT 11 12/12/2024     Lab Results   Component Value Date    WBC 4.43 02/27/2025    WBC 7.30 12/12/2024    HGB 9.8 (L) 02/27/2025    HGB 10.7 (L) 12/12/2024    HCT 34.2 02/27/2025    HCT 33.6 (L) 12/12/2024    MCV 76.2 (L) 02/27/2025    MCV 80.8 12/12/2024     02/27/2025     12/12/2024     No results found for: \"PROBNP\", \"BNP\"  No results found for: \"CKTOTAL\", \"CKMB\", \"CKMBINDEX\", \"TROPONINI\", \"TROPONINT\"  Lab Results   Component Value Date    TSH 0.650 12/12/2024    TSH 0.575 01/01/2020                 ECG 12 Lead    Date/Time: 4/9/2025 11:28 AM  Performed by: Lazarus, " Al SIMONS PA-C    Authorized by: Al Qiu PA-C  Comparison: compared with previous ECG from 3/6/2025  Rhythm: sinus rhythm  Rate: normal  BPM: 79  QRS axis: normal    Clinical impression: normal ECG  Comments: Tc 436                ASSESSMENT & PLAN       Diagnoses and all orders for this visit:    1-2. PSVT (paroxysmal supraventricular tachycardia) (Primary),  s/p orthodromic AVRT ablation 3/6/2025  She been doing great since the ablation.  She feels like she is back to normal and will still have an occasional palpitation but nothing sustained.  She is able to discontinue her aspirin as it has been over 1 month postop.    If she has any new episodes of sustained palpitation she will call us.  Will check in with her in 6 months and at that point if everything is still get we probably can just start following her as needed.        Follow Up:  Return in about 6 months (around 10/9/2025) for Dr. Zavaleta- Routine.  Patient was given instructions and counseling regarding her condition or for health maintenance advice. Please contact office if worsening symptoms or proceed to ER when appropriate.      Al Qiu PA-C  04/09/25  11:28 EDT    MEDICATIONS         Discharge Medications            Accurate as of April 9, 2025 11:28 AM. If you have any questions, ask your nurse or doctor.                Continue These Medications        Instructions Start Date   Wegovy 0.5 MG/0.5ML solution auto-injector  Generic drug: Semaglutide-Weight Management   INJECT 0.5MG UNDER THE SKIN EVERY WEEK ADMINISTER WEEKS 5 THRU 8 OF THERAPY AS DIRECTED                   **Aleena Disclaimer: This note was dictated using an electronic transcription. The electronic translation of spoken language may permit erroneous, or at times, nonsensical words or phrases to be inadvertently transcribed. Although I have reviewed the note for such errors, some may still exist.

## 2025-05-07 ENCOUNTER — PREP FOR SURGERY (OUTPATIENT)
Dept: OTHER | Facility: HOSPITAL | Age: 40
End: 2025-05-07
Payer: COMMERCIAL

## 2025-05-07 ENCOUNTER — LAB REQUISITION (OUTPATIENT)
Dept: LAB | Facility: HOSPITAL | Age: 40
End: 2025-05-07
Payer: COMMERCIAL

## 2025-05-07 ENCOUNTER — OUTSIDE FACILITY SERVICE (OUTPATIENT)
Dept: OBSTETRICS AND GYNECOLOGY | Facility: CLINIC | Age: 40
End: 2025-05-07
Payer: COMMERCIAL

## 2025-05-07 DIAGNOSIS — N92.1 EXCESSIVE AND FREQUENT MENSTRUATION WITH IRREGULAR CYCLE: ICD-10-CM

## 2025-05-07 DIAGNOSIS — Z98.890 POST-OPERATIVE STATE: Primary | ICD-10-CM

## 2025-05-07 PROCEDURE — 88305 TISSUE EXAM BY PATHOLOGIST: CPT | Performed by: OBSTETRICS & GYNECOLOGY

## 2025-05-07 PROCEDURE — 58558 HYSTEROSCOPY BIOPSY: CPT | Performed by: OBSTETRICS & GYNECOLOGY

## 2025-05-07 RX ORDER — HYDROCODONE BITARTRATE AND ACETAMINOPHEN 5; 325 MG/1; MG/1
1 TABLET ORAL EVERY 6 HOURS PRN
Qty: 10 TABLET | Refills: 0 | Status: SHIPPED | OUTPATIENT
Start: 2025-05-07 | End: 2025-05-12

## 2025-05-07 NOTE — H&P
Marni Danielle  : 1985  MRN: [id number]  CSN: [id number]    History and Physical    Subjective   Marni Danielle is a 39 y.o. year old  who present for diagnostic hysteroscopy with anticipated Myosure due to menorrhagia from suspected endometrial polyp based upon saline infusion sonography.  She is mild anemia based upon vaginal bleeding from presumed polyp.    Past Medical History:   Diagnosis Date    Palpitations 2022    Anxiety     Arrhythmia     SVT (supraventricular tachycardia)      Past Surgical History:   Procedure Laterality Date    TONSILLECTOMY AND ADENOIDECTOMY  1990    BREAST BIOPSY Left 2002    ESSURE TUBAL LIGATION  2014    BILATERAL BREAST REDUCTION  2022    ABDOMINOPLASTY  2022    CARDIAC ELECTROPHYSIOLOGY PROCEDURE N/A 3/6/2025    Procedure: Ablation SVT;  Surgeon: Steve Zavaleta MD;  Location: [facility] INVASIVE LOCATION;  Service: Cardiovascular;  Laterality: N/A;     OB History    Para Term  AB Living   3 3 3 0 0 3   SAB IAB Ectopic Molar Multiple Live Births   0 0 0 0 0 3      # Outcome Date GA Lbr El/2nd Weight Sex Type Anes PTL Lv   3 Term 14 39w0d  4269 g (9 lb 6.6 oz) M Vag-Spont   JAMILAH   2 Term 03/10/08   4309 g (9 lb 8 oz) M Vag-Spont   JAMILAH   1 Term 03   3856 g (8 lb 8 oz) F Vag-Spont   JAMILAH      Obstetric Comments   2003 - Samreen    - Dominic   2014 - Deras     Social History    Tobacco Use      Smoking status: Never        Passive exposure: Never      Smokeless tobacco: Never      Current Outpatient Medications:     Wegovy 0.5 MG/0.5ML solution auto-injector, INJECT 0.5MG UNDER THE SKIN EVERY WEEK ADMINISTER WEEKS 5 THRU 8 OF THERAPY AS DIRECTED, Disp: , Rfl:     No Known Allergies    Review of Systems   Constitutional:  Negative for chills, fever and unexpected weight change.   HENT:  Negative for ear pain, facial swelling, sinus pressure, sneezing and sore throat.    Respiratory:  Negative for cough,  shortness of breath and wheezing.    Cardiovascular:  Negative for chest pain and palpitations.   Hematological:  Does not bruise/bleed easily.         Objective     Vital Signs: See nursing documentation   General: well developed; well nourished  no acute distress  mentation appropriate   Mental status: Alert and oriented   Heart: Not performed.   Lungs: breathing is unlabored   Abdomen: soft, non-tender; no masses  no umbilical or inguinal hernias are present  no hepato-splenomegaly   Pelvis: Not performed.        Assessment   Probable endometrial polyp     Plan   Diagnostic hysteroscopy Myosure resection of intracavitary pathology  I have previously discussed with Marni the risks of her hysteroscopy with possible Myosure resection of any intracavitary pathology. Risks discussed included intraoperative bleeding, infection at the site of surgery and damage to the adjacent surrounding organs. Additionally, I explained the small risk for reoperation in the event of unanticipated bleeding or surgical injury.  Finally the risks of cerebral and/or pulmonary edema related to excess absorption of the distending fluid & the small chance the procedure could not be completed if there was an excessive mismatch of fluid infused vs. fluid recovered were explained.        Ciaran Burdick MD       (Pt's PCP is Leia Schmidt APRN)

## 2025-05-08 LAB
CYTO UR: NORMAL
LAB AP CASE REPORT: NORMAL
LAB AP CLINICAL INFORMATION: NORMAL
PATH REPORT.FINAL DX SPEC: NORMAL
PATH REPORT.GROSS SPEC: NORMAL

## 2025-05-14 ENCOUNTER — PATIENT MESSAGE (OUTPATIENT)
Dept: OBSTETRICS AND GYNECOLOGY | Facility: CLINIC | Age: 40
End: 2025-05-14
Payer: COMMERCIAL

## 2025-05-14 ENCOUNTER — TELEPHONE (OUTPATIENT)
Dept: OBSTETRICS AND GYNECOLOGY | Facility: CLINIC | Age: 40
End: 2025-05-14
Payer: COMMERCIAL

## 2025-05-14 NOTE — TELEPHONE ENCOUNTER
Pt reports that she began her period today and was told that she cannot use a tampon for one week by our office and the form states two weeks after post op. Please advise.

## 2025-05-14 NOTE — TELEPHONE ENCOUNTER
Pt informed and stated understanding.     Tazorac Counseling:  Patient advised that medication is irritating and drying.  Patient may need to apply sparingly and wash off after an hour before eventually leaving it on overnight.  The patient verbalized understanding of the proper use and possible adverse effects of tazorac.  All of the patient's questions and concerns were addressed.

## 2025-05-14 NOTE — TELEPHONE ENCOUNTER
If she can use a pad that would be preferred but if she needs to use a tampon that would be fine as well

## 2025-05-23 ENCOUNTER — OFFICE VISIT (OUTPATIENT)
Dept: OBSTETRICS AND GYNECOLOGY | Facility: CLINIC | Age: 40
End: 2025-05-23
Payer: COMMERCIAL

## 2025-05-23 VITALS
DIASTOLIC BLOOD PRESSURE: 80 MMHG | SYSTOLIC BLOOD PRESSURE: 122 MMHG | RESPIRATION RATE: 14 BRPM | BODY MASS INDEX: 28.41 KG/M2 | WEIGHT: 198 LBS

## 2025-05-23 DIAGNOSIS — N39.3 PRIMARY STRESS URINARY INCONTINENCE: ICD-10-CM

## 2025-05-23 DIAGNOSIS — Z98.890 POST-OPERATIVE STATE: Primary | ICD-10-CM

## 2025-05-23 PROBLEM — F41.9 ANXIETY: Status: RESOLVED | Noted: 2024-01-01 | Resolved: 2025-05-23

## 2025-05-23 PROBLEM — R00.2 PALPITATIONS: Status: RESOLVED | Noted: 2022-01-24 | Resolved: 2025-05-23

## 2025-05-23 PROBLEM — I47.10 PSVT (PAROXYSMAL SUPRAVENTRICULAR TACHYCARDIA): Status: RESOLVED | Noted: 2025-01-17 | Resolved: 2025-05-23

## 2025-05-23 RX ORDER — ERGOCALCIFEROL 1.25 MG/1
CAPSULE, LIQUID FILLED ORAL
COMMUNITY
Start: 2025-05-06

## 2025-05-23 NOTE — PROGRESS NOTES
Subjective   Chief Complaint   Patient presents with    Post-op     Marni Danielle is a 39 y.o. year old  presenting to be seen for her post-operative visit.  Currently she reports no problems with eating, bowel movements, voiding, or wound drainage and pain is well controlled.    The pathology results from her procedure are in Marni's record and are benign.     Does have questions about stress incontinence and wonders what can be done to help that    The following portions of the patient's history were reviewed and updated as appropriate:current medications and allergies       Objective   /80   Resp 14   Wt 89.8 kg (198 lb)   LMP 2025   BMI 28.41 kg/m²     General:  well developed; well nourished  no acute distress  mentation appropriate   Abdomen: soft, non-tender; no masses  no umbilical or inguinal hernias are present  no hepato-splenomegaly   Pelvis: Clinical staff was present for exam  External genitalia:  normal appearance of the external genitalia including Bartholin's and [place]'s glands.  :  urethral meatus normal; urethra normal:  Vaginal:  normal pink mucosa without prolapse or lesions.  Cervix:  normal appearance.  Uterus:  normal size, shape and consistency.          Assessment   S/P hysteroscopy with Myosure  History of menorrhagia presumed related to polyp  Stress urinary incontinence     Plan   May return to full activity with no restrictions  If bleeding continues to be an issue now with a polyp removed anticipate Mirena will be a very effective treatment  Initial let us plan to set her up with pelvic floor physical therapy and if that is not effective we can talk about further options including urodynamic testing and surgical treatment.  Also impact of weight associated with stress incontinence reviewed  The importance of keeping all planned follow-up and taking all medications as prescribed was emphasized.  Follow up PRN  .    No orders of the defined types were  placed in this encounter.         This note was electronically signed.    Ciaran Burdick M.D.  May 23, 2025    Part of this note may be an electronic transcription/translation of spoken language to printed text using the Dragon Dictation System.

## 2025-06-24 ENCOUNTER — HOSPITAL ENCOUNTER (OUTPATIENT)
Dept: PHYSICAL THERAPY | Facility: HOSPITAL | Age: 40
Setting detail: THERAPIES SERIES
Discharge: HOME | End: 2025-06-24
Payer: COMMERCIAL

## 2025-06-24 DIAGNOSIS — N39.3 STRESS INCONTINENCE: Primary | ICD-10-CM

## 2025-06-24 DIAGNOSIS — N94.10 DYSPAREUNIA IN FEMALE: ICD-10-CM

## 2025-06-24 PROCEDURE — 97161 PT EVAL LOW COMPLEX 20 MIN: CPT

## 2025-06-24 PROCEDURE — 97530 THERAPEUTIC ACTIVITIES: CPT

## 2025-06-24 NOTE — THERAPY EVALUATION
Physical Therapy Pelvic Initial Evaluation     Patient: Marni Danielle   : 1985  Diagnosis/ICD-10 Code:      ICD-10-CM ICD-9-CM   1. Stress incontinence  N39.3 [id number]   2. Dyspareunia in female  N94.10 625.0     Referring practitioner: Ciaran Burdick MD  Date of Initial Visit: 2025  Today's Date: 2025      Subjective Evaluation    History of Present Illness  Date of onset: 2020  Date of surgery: 2025  Mechanism of injury: Pt was referred to PF PT by Dr. Burdick due to SARAH. She has a Hx of hysteroscopy and myosure (may 2025). She has a Hx of heavy periods which was the purpose for procedure, she reports it has helped. Has had 2 periods since procedure and notices decreased bleeding. She reports it is Hard to exercise due to SARAH - running etc causes leakage. Additionally, she had a Heart ablation earlier this year (2025). She reports her SARAH timeline has been occurring over a couple of years, 5-7 years and has progressively gotten worse. She reports her leakage with Sneezing was her final straw.    No urge incontinence    No nocturnal enuresis      Smallest 8.5 lb  All vaginal deliveries  First one had tearing w sutures        Subjective comment: SARAH, pain w intercourse  Patient Occupation: [employer] Quality of life: good    Pain  No pain reported          Chief Complaint: No chief complaint on file.     Functional Outcome Measure:   APFQ  Bladder 23/45  Bowel 12/34  Prolapse 0/15  Sexual /  Pain  No pain reported     Bladder function:    Incontinence: yes stress  # of pads in 24 hours: no   How soaked is pad when changed: na  What specifically makes you leak: sneeze, jump, run  Leak at night: no  Urination at night: 1  Use bathroom “just in case”: no  Strong urinary urge: appropriate  Leaking with urge: no  Urge triggers: na  Complete bladder voiding %: 100  Urinary splaying: rarely  Post-micturition dribble: yes  Frequency of urination: 5-7  Discomfort with  urination: no  Vaginal or anal itching: no  Fluid irritants consumed daily: water 30 oz,   Food irritants consumed daily: no    Bowel function:    No issues noted    Sexual activity  Sexually active: yes  Pain with penetration: no  Pain after sex: sometimes  Lubrication: limited  Positions of comfort & discomfort: pending  ED: pending  Rarely will have pain w deeper penetration - 1x per week  Type: deep or   or situational  or secondary - started about a year ago      Prior PT or other treatment: no    Diagnostics:    PMH:   Past Medical History:   Diagnosis Date    Anxiety     Palpitations 2022    WPW syndrome     Better since ablation         Surgical HX:   Past Surgical History:   Procedure Laterality Date    ABDOMINOPLASTY  2022    BILATERAL BREAST REDUCTION  2022    BREAST BIOPSY Left 2002    CARDIAC ELECTROPHYSIOLOGY PROCEDURE N/A 2025    Procedure: Ablation SVT;  Surgeon: Steve Zavaleta MD;  Location: [facility] INVASIVE LOCATION;  Service: Cardiovascular;  Laterality: N/A;    ESSURE TUBAL LIGATION  2014    MYOSURE  2025    TONSILLECTOMY AND ADENOIDECTOMY          Menstrual cycle: 2 since procedure in may    Birth HX (#, when, type of delivery, complications):   Tearing on first     Meds:   Current Outpatient Medications:     vitamin D (ERGOCALCIFEROL) 1.25 MG (54668 UT) capsule capsule, , Disp: , Rfl:     Wegovy 0.5 MG/0.5ML solution auto-injector, INJECT 0.5MG UNDER THE SKIN EVERY WEEK ADMINISTER WEEKS 5 THRU 8 OF THERAPY AS DIRECTED, Disp: , Rfl:            Objective          Neurological Testing     Sensation     Hip   Left Hip   Intact: light touch    Right Hip   Intact: light touch    Strength/Myotome Testing     Left Hip   Planes of Motion   Flexion: 5  Abduction: 4-  Adduction: 4-  External rotation: 3+  Internal rotation: 3+    Right Hip   Planes of Motion   Flexion: 5  Abduction: 4-  Adduction: 4-  External rotation: 3+  Internal rotation:  3+    Left Knee   Extension: 5    Right Knee   Extension: 5    Left Ankle/Foot   Dorsiflexion: 5  Great toe extension: 5    Right Ankle/Foot   Dorsiflexion: 5  Great toe extension: 5       Internal exam deferred today     See flowsheet for details of treatment following evaluation.    Assessment & Plan       Assessment  Impairments: abnormal coordination, abnormal muscle firing, abnormal muscle tone, activity intolerance, impaired physical strength, lacks appropriate home exercise program and pain with function   Functional limitations: lifting, uncomfortable because of pain and unable to perform repetitive tasks   Assessment details: The patient is a 39 y.o. female who presents to physical therapy today for SARAH. Upon initial evaluation, the patient demonstrates the following impairments: SARAH w exercise and sneeze, pain w deeper penetration during intercourse. She presents w mauricio hip rotator and adductor/abductor weakness that could be contributing to her deficits. The patient would benefit from skilled pelvic PT services to address functional limitations and impairments and to improve patient quality of life.      Prognosis: good    Goals  Plan Goals: See flow sheet     Plan  Therapy options: will be seen for skilled therapy services  Planned modality interventions: dry needling, high voltage pulsed current (pain management) and high voltage pulsed current (spasm management)  Planned therapy interventions: abdominal trunk stabilization, home exercise program, manual therapy, motor coordination training, neuromuscular re-education, soft tissue mobilization, spinal/joint mobilization, strengthening, therapeutic activities, body mechanics training, ADL retraining, fine motor coordination training and functional ROM exercises  Frequency: 1x week  Duration in visits: 12  Duration in weeks: 12  Treatment plan discussed with: patient  Plan details: Planned modality interventions: TENS, ultrasound, cryotherapy,  thermotherapy (hydrocollator packs)  Planned therapy interventions: abdominal trunk stabilization, manual therapy, neuromuscular re-education, body mechanics training, flexibility, functional ROM exercises, gait training, home exercise program, joint mobilization, therapeutic activities, dry needling, stretching, strengthening, spinal/joint mobilization, soft tissue mobilization and postural training. CPT Codes to include: 83171, 40909, 92817, 01561, 64715, 56010, 03532. ;la         Therapy Charges for Today       Code Description Service Date Service Provider Modifiers Qty    87391907824 HC PT THERAPEUTIC ACT EA 15 MIN 6/24/2025 Dee Richard, PT GP 1    11680303126 HC PT EVAL LOW COMPLEXITY 4 6/24/2025 Dee Richard, PT GP 1            PT SIGNATURE: Dee Richard PT, DPT  [place] License # [id number]    DATE TREATMENT INITIATED: 6/24/2025    Initial Certification  Certification Period: 9/22/2025  I certify that the therapy services are furnished while this patient is under my care.  The services outlined above are required by this patient, and will be reviewed every 90 days.     PHYSICIAN: Ciaran Burdick MD      DATE: 06/24/2025     Please sign and return via fax to  . Thank you, [facility].

## (undated) DEVICE — PREF.GUIDING SHEATH W/MULT.CRV: Brand: PREFACE

## (undated) DEVICE — INTRO HEMO TRIO 12F

## (undated) DEVICE — Device: Brand: REFERENCE PATCH CARTO 3

## (undated) DEVICE — BI-DIRECTIONAL NAVIGATION CATHETER, NAV, D-F: Brand: QDOT MICRO

## (undated) DEVICE — Device: Brand: WEBSTER

## (undated) DEVICE — Device: Brand: WEBSTER CS

## (undated) DEVICE — PINNACLE INTRODUCER SHEATH: Brand: PINNACLE

## (undated) DEVICE — SYS TRNSEP ACC ACROSS ADLT BRK1 71CM

## (undated) DEVICE — Device

## (undated) DEVICE — LOU EP: Brand: MEDLINE INDUSTRIES, INC.

## (undated) DEVICE — Device: Brand: SMARTABLATE